# Patient Record
Sex: MALE | Race: WHITE | NOT HISPANIC OR LATINO | Employment: OTHER | URBAN - NONMETROPOLITAN AREA
[De-identification: names, ages, dates, MRNs, and addresses within clinical notes are randomized per-mention and may not be internally consistent; named-entity substitution may affect disease eponyms.]

---

## 2017-06-21 ENCOUNTER — TRANSCRIBE ORDERS (OUTPATIENT)
Dept: ADMINISTRATIVE | Facility: HOSPITAL | Age: 67
End: 2017-06-21

## 2017-06-21 DIAGNOSIS — I65.23 BILATERAL CAROTID ARTERY STENOSIS: Primary | ICD-10-CM

## 2017-06-27 ENCOUNTER — OFFICE VISIT (OUTPATIENT)
Dept: VASCULAR SURGERY | Facility: CLINIC | Age: 67
End: 2017-06-27

## 2017-06-27 ENCOUNTER — HOSPITAL ENCOUNTER (OUTPATIENT)
Dept: ULTRASOUND IMAGING | Facility: HOSPITAL | Age: 67
Discharge: HOME | End: 2017-06-27
Attending: SURGERY

## 2017-06-27 ENCOUNTER — HOSPITAL ENCOUNTER (OUTPATIENT)
Dept: ULTRASOUND IMAGING | Facility: HOSPITAL | Age: 67
Discharge: HOME | End: 2017-06-27
Attending: SURGERY | Admitting: SURGERY

## 2017-06-27 VITALS
SYSTOLIC BLOOD PRESSURE: 124 MMHG | WEIGHT: 237 LBS | BODY MASS INDEX: 30.42 KG/M2 | DIASTOLIC BLOOD PRESSURE: 72 MMHG | HEART RATE: 66 BPM | HEIGHT: 74 IN

## 2017-06-27 DIAGNOSIS — I70.90 ARTERY OCCLUSION: ICD-10-CM

## 2017-06-27 DIAGNOSIS — I71.40 ABDOMINAL AORTIC ANEURYSM (AAA) WITHOUT RUPTURE (HCC): ICD-10-CM

## 2017-06-27 DIAGNOSIS — I71.40 AAA (ABDOMINAL AORTIC ANEURYSM) WITHOUT RUPTURE (HCC): ICD-10-CM

## 2017-06-27 DIAGNOSIS — I65.23 BILATERAL CAROTID ARTERY STENOSIS: ICD-10-CM

## 2017-06-27 DIAGNOSIS — I72.2 RENAL ARTERY ANEURYSM (HCC): ICD-10-CM

## 2017-06-27 DIAGNOSIS — I72.4 BILATERAL POPLITEAL ARTERY ANEURYSM (HCC): Primary | ICD-10-CM

## 2017-06-27 PROCEDURE — 76775 US EXAM ABDO BACK WALL LIM: CPT

## 2017-06-27 PROCEDURE — 93880 EXTRACRANIAL BILAT STUDY: CPT

## 2017-06-27 PROCEDURE — 93880 EXTRACRANIAL BILAT STUDY: CPT | Performed by: SURGERY

## 2017-06-27 PROCEDURE — 93975 VASCULAR STUDY: CPT

## 2017-06-27 PROCEDURE — 99214 OFFICE O/P EST MOD 30 MIN: CPT | Performed by: NURSE PRACTITIONER

## 2017-06-27 RX ORDER — ASPIRIN 81 MG/1
TABLET ORAL
COMMUNITY

## 2017-06-27 RX ORDER — ATORVASTATIN CALCIUM 40 MG/1
40 TABLET, FILM COATED ORAL DAILY
COMMUNITY

## 2017-06-27 RX ORDER — FENOFIBRATE 145 MG/1
145 TABLET, COATED ORAL DAILY
COMMUNITY
End: 2019-06-20

## 2017-06-27 RX ORDER — LISINOPRIL AND HYDROCHLOROTHIAZIDE 20; 12.5 MG/1; MG/1
TABLET ORAL
COMMUNITY
Start: 2013-11-01

## 2017-06-27 NOTE — PROGRESS NOTES
"06/27/2017      Freddy Cedillo MD  [address] [place] [address] [address]  [place] [address]        Dayna Israel  1950    Chief Complaint   Patient presents with   • Follow-up     Abdominal and carotid sttudy results.Denies symptoms       Dear Freddy Cedillo MD:    HPI     I had the pleasure of seeing you patient in the office today for follow up.  As you recall, the patient is a 66 y.o. male who Was a previous patient of Dr. Ricks.  He had a previously repaired 4.9 cm abdominal aortic aneurysm.  We have been following with ultrasound.  He denies any abdominal pain, strokelike symptoms or claudication symptoms to his lower extremities.  He did have noninvasive testing performed today, which I did review in office.    /72  Pulse 66  Ht 73.5\" (186.7 cm)  Wt 237 lb (108 kg)  BMI 30.84 kg/m2  Physical Exam   Constitutional: He is oriented to person, place, and time. He appears well-developed and well-nourished.   HENT:   Head: Normocephalic and atraumatic.   Eyes: Pupils are equal, round, and reactive to light. No scleral icterus.   Neck: Normal range of motion. Neck supple. No thyromegaly present.   Cardiovascular: Normal rate, regular rhythm, normal heart sounds and intact distal pulses.    Pulmonary/Chest: Effort normal and breath sounds normal.   Abdominal: Soft. Bowel sounds are normal.   Musculoskeletal: Normal range of motion.   Neurological: He is alert and oriented to person, place, and time.   Skin: Skin is warm and dry.   Psychiatric: He has a normal mood and affect. His behavior is normal. Judgment and thought content normal.   Nursing note and vitals reviewed.      DIAGNOSTIC DATA:    Us Carotid Bilateral    Result Date: 6/28/2017  Narrative: History: Carotid occlusive disease      Impression: Impression: 1. There is less than 50% stenosis of the right internal carotid artery. 2. There is less than 50% stenosis of the left internal carotid artery. 3. Antegrade flow is demonstrated in " bilateral vertebral arteries.  Comments: Bilateral carotid vertebral arterial duplex scan was performed.  Grayscale imaging shows intimal thickening and calcified elements at the carotid bifurcation. The right internal carotid artery peak systolic velocity is 124 cm/sec. The end-diastolic velocity is 35.2 cm/sec. The right ICA/CCA ratio is approximately 1.25 . These findings correlate with less than 50% stenosis of the right internal carotid artery.  Grayscale imaging shows intimal thickening and calcified elements at the carotid bifurcation. The left internal carotid artery peak systolic velocity is 105 cm/sec. The end-diastolic velocity is 34.6 cm/sec. The left ICA/CCA ratio is approximately 1.09 . These findings correlate with less than 50% stenosis of the left internal carotid artery.  Antegrade flow is demonstrated in bilateral vertebral arteries.    This report was finalized on 06/28/2017 19:50 by Dr. River Selby MD.    Us Aorta Limited    Result Date: 6/27/2017  Narrative: EXAMINATION:  AORTA LIMITED- 6/27/2017 8:53 AM CDT  HISTORY: Abdominal aortic aneurysm, previous stent graft placement.  REPORT: Sonographic images of the abdomen were obtained to evaluate the abdominal aorta. Comparison is made with the most recent ultrasound from 06/23/2016.  The proximal aorta measures 2.2 x 2.5 cm in diameter, the mid aorta measures 2.3 x 2.2 cm in diameter in the distal aorta shows fusiform dilatation, measuring 4.0 x 4.6 cm in transverse diameter. This compares with 4.2 x 4.4 cm previous study. The slight difference is probably related to measuring technique. No real change in the aortic caliber is seen. The right common iliac artery diameter is 1.2 cm, the left common iliac artery diameter is 1.1 cm. This compares with 1.4 and 1.4 cm previously. A stent graft is visible within the distal aorta.      Impression: Stable fusiform distal abdominal aortic aneurysm with maximum diameter of approximately 4.0 x 4.6  cm. Interval decrease in size of maximum diameter of the iliac arteries. This report was finalized on 06/27/2017 09:00 by Dr. Donnie Rene MD.    Us Renal Artery Complete    Result Date: 6/27/2017  Narrative: HISTORY: Renal artery disease.  FINDINGS: Sonography is performed of the kidneys in the transverse and longitudinal projections. The right kidney measures 13.9 cm and the left kidney 13.4 cm in [id number] length. Both kidneys are normal in morphologic appearance with no mass or hydronephrosis. The partially filled urinary bladder is unremarkable.  Doppler examination was performed of the abdominal aorta and renal arteries using B-mode/grayscale imaging with spectral analysis and color flow imaging. Peak systolic velocity within the abdominal aorta is 106 cm/s. Peak systolic velocities on the right are 95, 64, 51 and 18 cm/s respectively within the proximal, mid, distal and arcuate right renal arteries resulting in a maximal velocity ratio at the origin of the right renal artery of 0.90.  Examination of the left renal artery demonstrates peak systolic velocities of 76, 77, 52 and 17 cm/s respectively within the proximal, mid, distal and arcuate left renal arteries with a maximum velocity ratio of 0.72. There are similar resistive indices within the renal arteries bilaterally.      Impression: 1.. Normal sonogram of the kidneys with no mass or hydronephrosis. No evidence of cortical atrophy. 2. Normal peak systolic velocities within the renal arteries with no elevation of the velocity ratio or peak systolic velocity. This report was finalized on 06/27/2017 12:25 by Dr. Jonathan Chacon MD.    Us Renal Bilateral    Result Date: 6/27/2017  Narrative: HISTORY: Renal artery disease.  FINDINGS: Sonography is performed of the kidneys in the transverse and longitudinal projections. The right kidney measures 13.9 cm and the left kidney 13.4 cm in gkxj-aj-wrnl length. Both kidneys are normal in morphologic appearance  with no mass or hydronephrosis. The partially filled urinary bladder is unremarkable.  Doppler examination was performed of the abdominal aorta and renal arteries using B-mode/grayscale imaging with spectral analysis and color flow imaging. Peak systolic velocity within the abdominal aorta is 106 cm/s. Peak systolic velocities on the right are 95, 64, 51 and 18 cm/s respectively within the proximal, mid, distal and arcuate right renal arteries resulting in a maximal velocity ratio at the origin of the right renal artery of 0.90.  Examination of the left renal artery demonstrates peak systolic velocities of 76, 77, 52 and 17 cm/s respectively within the proximal, mid, distal and arcuate left renal arteries with a maximum velocity ratio of 0.72. There are similar resistive indices within the renal arteries bilaterally.      Impression: 1.. Normal sonogram of the kidneys with no mass or hydronephrosis. No evidence of cortical atrophy. 2. Normal peak systolic velocities within the renal arteries with no elevation of the velocity ratio or peak systolic velocity. This report was finalized on 06/27/2017 12:25 by Dr. Jonathan Chacon MD.    HISTORY: Renal artery disease.      FINDINGS: Sonography is performed of the kidneys in the transverse and  longitudinal projections. The right kidney measures 13.9 cm and the left  kidney 13.4 cm in jsjd-nj-post length. Both kidneys are normal in  morphologic appearance with no mass or hydronephrosis. The partially  filled urinary bladder is unremarkable.      Doppler examination was performed of the abdominal aorta and renal  arteries using B-mode/grayscale imaging with spectral analysis and color  flow imaging. Peak systolic velocity within the abdominal aorta is 106  cm/s. Peak systolic velocities on the right are 95, 64, 51 and 18 cm/s  respectively within the proximal, mid, distal and arcuate right renal  arteries resulting in a maximal velocity ratio at the origin of the  right  renal artery of 0.90.      Examination of the left renal artery demonstrates peak systolic  velocities of 76, 77, 52 and 17 cm/s respectively within the proximal,  mid, distal and arcuate left renal arteries with a maximum velocity  ratio of 0.72. There are similar resistive indices within the renal  arteries bilaterally.      IMPRESSION:  1.. Normal sonogram of the kidneys with no mass or hydronephrosis. No  evidence of cortical atrophy.  2. Normal peak systolic velocities within the renal arteries with no  elevation of the velocity ratio or peak systolic velocity.  This report was finalized on 06/27/2017 12:25 by Dr. Jonathan Chacon MD.    History: Carotid occlusive disease      IMPRESSION:  Impression:  1. There is less than 50% stenosis of the right internal carotid artery.  2. There is less than 50% stenosis of the left internal carotid artery.  3. Antegrade flow is demonstrated in bilateral vertebral arteries.      Comments: Bilateral carotid vertebral arterial duplex scan was  performed.      Grayscale imaging shows intimal thickening and calcified elements at the  carotid bifurcation. The right internal carotid artery peak systolic  velocity is 124 cm/sec. The end-diastolic velocity is 35.2 cm/sec. The  right ICA/CCA ratio is approximately 1.25 . These findings correlate  with less than 50% stenosis of the right internal carotid artery.      Grayscale imaging shows intimal thickening and calcified elements at the  carotid bifurcation. The left internal carotid artery peak systolic  velocity is 105 cm/sec. The end-diastolic velocity is 34.6 cm/sec. The  left ICA/CCA ratio is approximately 1.09 . These findings correlate with  less than 50% stenosis of the left internal carotid artery.      Antegrade flow is demonstrated in bilateral vertebral arteries.      This report was finalized on 06/28/2017 19:50 by Dr. River Selby MD.  Patient Active Problem List   Diagnosis   • Small bilateral Popliteal Artery  Aneurysm   • Hypertension   • Hyperlipidemia   • AAA (abdominal aortic aneurysm)   • Carotid stenosis   • Diabetes         ICD-10-CM ICD-9-CM   1. 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 Abdominal aortic aneurysm (AAA) without rupture I71.4 441.4       Lab Frequency Next Occurrence   US carotid bilateral Once 9/2/2016   US Aorta Limited Once 6/27/2018   US Arterial Doppler Lower Extremity Bilateral Once 6/27/2018       PLAN: After thoroughly evaluating Dayna Israel, I believe the best course of action is to remain conservative from a vascular standpoint.  We will see Dayna Israel back in 1 year with repeat noninvasive testing for continued surveillance.  Next visit we will check his carotids and bilateral popliteal artery aneurysms.  We will recheck his aorta in 2 years.  The patient is to continue taking their medications as previously discussed.   This was all discussed in full with complete understanding.  Thank you for allowing me to participate in the care of your patient.  Please do not hesitate to call with any questions or concerns.  We will keep you aware of any further encounters with Dayna Israel.      Sincerely Yours,      PRANAY Walsh

## 2017-07-03 LAB
ALBUMIN SERPL-MCNC: 3.9 G/DL (ref 3.5–5.2)
ALP BLD-CCNC: 57 U/L (ref 40–130)
ALT SERPL-CCNC: 28 U/L (ref 5–41)
ANION GAP SERPL CALCULATED.3IONS-SCNC: 21 MMOL/L (ref 7–19)
AST SERPL-CCNC: 22 U/L (ref 5–40)
BILIRUB SERPL-MCNC: 0.3 MG/DL (ref 0.2–1.2)
BUN BLDV-MCNC: 16 MG/DL (ref 8–23)
CALCIUM SERPL-MCNC: 9.3 MG/DL (ref 8.8–10.2)
CHLORIDE BLD-SCNC: 102 MMOL/L (ref 98–111)
CO2: 21 MMOL/L (ref 22–29)
CREAT SERPL-MCNC: 0.9 MG/DL (ref 0.5–1.2)
CREATININE URINE: 194.6 MG/DL (ref 4.2–622)
GFR NON-AFRICAN AMERICAN: >60
GLUCOSE BLD-MCNC: 194 MG/DL (ref 74–109)
HBA1C MFR BLD: 8.3 %
LDL CHOLESTEROL DIRECT: 93 MG/DL
MICROALBUMIN UR-MCNC: <1.2 MG/DL (ref 0–19)
MICROALBUMIN/CREAT UR-RTO: NORMAL MG/G
POTASSIUM SERPL-SCNC: 3.8 MMOL/L (ref 3.5–5)
SODIUM BLD-SCNC: 144 MMOL/L (ref 136–145)
TOTAL PROTEIN: 7.3 G/DL (ref 6.6–8.7)

## 2017-07-11 ENCOUNTER — OFFICE VISIT (OUTPATIENT)
Dept: INTERNAL MEDICINE | Age: 67
End: 2017-07-11
Payer: MEDICARE

## 2017-07-11 VITALS
OXYGEN SATURATION: 96 % | RESPIRATION RATE: 18 BRPM | HEART RATE: 58 BPM | SYSTOLIC BLOOD PRESSURE: 132 MMHG | HEIGHT: 72 IN | BODY MASS INDEX: 30.34 KG/M2 | DIASTOLIC BLOOD PRESSURE: 68 MMHG | WEIGHT: 224 LBS | TEMPERATURE: 97.8 F

## 2017-07-11 DIAGNOSIS — E78.2 MIXED HYPERLIPIDEMIA: ICD-10-CM

## 2017-07-11 DIAGNOSIS — E11.9 TYPE 2 DIABETES MELLITUS WITHOUT COMPLICATION, WITHOUT LONG-TERM CURRENT USE OF INSULIN (HCC): ICD-10-CM

## 2017-07-11 DIAGNOSIS — Z00.00 HEALTH CARE MAINTENANCE: Primary | ICD-10-CM

## 2017-07-11 PROCEDURE — 1036F TOBACCO NON-USER: CPT | Performed by: NURSE PRACTITIONER

## 2017-07-11 PROCEDURE — 3046F HEMOGLOBIN A1C LEVEL >9.0%: CPT | Performed by: NURSE PRACTITIONER

## 2017-07-11 PROCEDURE — 99214 OFFICE O/P EST MOD 30 MIN: CPT | Performed by: NURSE PRACTITIONER

## 2017-07-11 PROCEDURE — 1123F ACP DISCUSS/DSCN MKR DOCD: CPT | Performed by: NURSE PRACTITIONER

## 2017-07-11 PROCEDURE — 3017F COLORECTAL CA SCREEN DOC REV: CPT | Performed by: NURSE PRACTITIONER

## 2017-07-11 PROCEDURE — G8417 CALC BMI ABV UP PARAM F/U: HCPCS | Performed by: NURSE PRACTITIONER

## 2017-07-11 PROCEDURE — 4040F PNEUMOC VAC/ADMIN/RCVD: CPT | Performed by: NURSE PRACTITIONER

## 2017-07-11 PROCEDURE — G8427 DOCREV CUR MEDS BY ELIG CLIN: HCPCS | Performed by: NURSE PRACTITIONER

## 2017-07-11 RX ORDER — LISINOPRIL AND HYDROCHLOROTHIAZIDE 20; 12.5 MG/1; MG/1
TABLET ORAL
COMMUNITY
Start: 2013-11-01 | End: 2017-07-19 | Stop reason: SDUPTHER

## 2017-07-11 RX ORDER — FENOFIBRATE 145 MG/1
145 TABLET, COATED ORAL
COMMUNITY
End: 2017-07-19 | Stop reason: SDUPTHER

## 2017-07-11 RX ORDER — ATORVASTATIN CALCIUM 10 MG/1
TABLET, FILM COATED ORAL
COMMUNITY
End: 2017-07-19 | Stop reason: SDUPTHER

## 2017-07-11 ASSESSMENT — PATIENT HEALTH QUESTIONNAIRE - PHQ9
1. LITTLE INTEREST OR PLEASURE IN DOING THINGS: 0
2. FEELING DOWN, DEPRESSED OR HOPELESS: 0
SUM OF ALL RESPONSES TO PHQ QUESTIONS 1-9: 0
SUM OF ALL RESPONSES TO PHQ9 QUESTIONS 1 & 2: 0

## 2017-07-11 ASSESSMENT — ENCOUNTER SYMPTOMS
EYE DISCHARGE: 0
STRIDOR: 0
CONSTIPATION: 0
WHEEZING: 0
ABDOMINAL DISTENTION: 0
ABDOMINAL PAIN: 0
CHOKING: 0
VOMITING: 0
SHORTNESS OF BREATH: 0
EYE ITCHING: 0
NAUSEA: 0
DIARRHEA: 0
COLOR CHANGE: 0
SORE THROAT: 0
TROUBLE SWALLOWING: 0
BLOOD IN STOOL: 0
COUGH: 0

## 2017-07-19 RX ORDER — ATORVASTATIN CALCIUM 10 MG/1
10 TABLET, FILM COATED ORAL DAILY
Qty: 90 TABLET | Refills: 3 | Status: SHIPPED | OUTPATIENT
Start: 2017-07-19 | End: 2018-06-14 | Stop reason: SDUPTHER

## 2017-07-19 RX ORDER — METOPROLOL SUCCINATE 50 MG/1
50 TABLET, EXTENDED RELEASE ORAL DAILY
Qty: 90 TABLET | Refills: 3 | Status: SHIPPED | OUTPATIENT
Start: 2017-07-19 | End: 2018-06-14 | Stop reason: SDUPTHER

## 2017-07-19 RX ORDER — LISINOPRIL AND HYDROCHLOROTHIAZIDE 20; 12.5 MG/1; MG/1
1 TABLET ORAL DAILY
Qty: 90 TABLET | Refills: 3 | Status: SHIPPED | OUTPATIENT
Start: 2017-07-19 | End: 2018-06-14 | Stop reason: SDUPTHER

## 2017-07-19 RX ORDER — FENOFIBRATE 145 MG/1
145 TABLET, COATED ORAL DAILY
Qty: 90 TABLET | Refills: 3 | Status: SHIPPED | OUTPATIENT
Start: 2017-07-19 | End: 2018-06-14 | Stop reason: SDUPTHER

## 2018-01-29 DIAGNOSIS — Z00.00 HEALTH CARE MAINTENANCE: ICD-10-CM

## 2018-01-29 DIAGNOSIS — E78.2 MIXED HYPERLIPIDEMIA: ICD-10-CM

## 2018-01-29 DIAGNOSIS — E11.9 TYPE 2 DIABETES MELLITUS WITHOUT COMPLICATION, WITHOUT LONG-TERM CURRENT USE OF INSULIN (HCC): ICD-10-CM

## 2018-01-29 LAB
ALBUMIN SERPL-MCNC: 4 G/DL (ref 3.5–5.2)
ALP BLD-CCNC: 47 U/L (ref 40–130)
ALT SERPL-CCNC: 23 U/L (ref 5–41)
ANION GAP SERPL CALCULATED.3IONS-SCNC: 13 MMOL/L (ref 7–19)
AST SERPL-CCNC: 21 U/L (ref 5–40)
BASOPHILS ABSOLUTE: 0 K/UL (ref 0–0.2)
BASOPHILS RELATIVE PERCENT: 0.5 % (ref 0–1)
BILIRUB SERPL-MCNC: 0.3 MG/DL (ref 0.2–1.2)
BUN BLDV-MCNC: 16 MG/DL (ref 8–23)
CALCIUM SERPL-MCNC: 9.4 MG/DL (ref 8.8–10.2)
CHLORIDE BLD-SCNC: 103 MMOL/L (ref 98–111)
CHOLESTEROL, TOTAL: 123 MG/DL (ref 160–199)
CO2: 26 MMOL/L (ref 22–29)
CREAT SERPL-MCNC: 0.9 MG/DL (ref 0.5–1.2)
EOSINOPHILS ABSOLUTE: 0.1 K/UL (ref 0–0.6)
EOSINOPHILS RELATIVE PERCENT: 1.6 % (ref 0–5)
GFR NON-AFRICAN AMERICAN: >60
GLUCOSE BLD-MCNC: 176 MG/DL (ref 74–109)
HBA1C MFR BLD: 7.4 %
HCT VFR BLD CALC: 43.9 % (ref 42–52)
HDLC SERPL-MCNC: 25 MG/DL (ref 55–121)
HEMOGLOBIN: 13.6 G/DL (ref 14–18)
LDL CHOLESTEROL CALCULATED: 76 MG/DL
LYMPHOCYTES ABSOLUTE: 2.4 K/UL (ref 1.1–4.5)
LYMPHOCYTES RELATIVE PERCENT: 39.1 % (ref 20–40)
MCH RBC QN AUTO: 27.5 PG (ref 27–31)
MCHC RBC AUTO-ENTMCNC: 31 G/DL (ref 33–37)
MCV RBC AUTO: 88.9 FL (ref 80–94)
MONOCYTES ABSOLUTE: 0.5 K/UL (ref 0–0.9)
MONOCYTES RELATIVE PERCENT: 7.7 % (ref 0–10)
NEUTROPHILS ABSOLUTE: 3.1 K/UL (ref 1.5–7.5)
NEUTROPHILS RELATIVE PERCENT: 50.8 % (ref 50–65)
PDW BLD-RTO: 12.4 % (ref 11.5–14.5)
PLATELET # BLD: 295 K/UL (ref 130–400)
PLATELET SLIDE REVIEW: ADEQUATE
PMV BLD AUTO: 11.2 FL (ref 9.4–12.4)
POTASSIUM SERPL-SCNC: 4 MMOL/L (ref 3.5–5)
RBC # BLD: 4.94 M/UL (ref 4.7–6.1)
SODIUM BLD-SCNC: 142 MMOL/L (ref 136–145)
TOTAL PROTEIN: 7 G/DL (ref 6.6–8.7)
TRIGL SERPL-MCNC: 108 MG/DL (ref 0–149)
WBC # BLD: 6.1 K/UL (ref 4.8–10.8)

## 2018-02-07 ENCOUNTER — OFFICE VISIT (OUTPATIENT)
Dept: INTERNAL MEDICINE | Age: 68
End: 2018-02-07
Payer: MEDICARE

## 2018-02-07 VITALS
HEART RATE: 69 BPM | OXYGEN SATURATION: 100 % | HEIGHT: 74 IN | BODY MASS INDEX: 27.59 KG/M2 | DIASTOLIC BLOOD PRESSURE: 82 MMHG | SYSTOLIC BLOOD PRESSURE: 142 MMHG | WEIGHT: 215 LBS

## 2018-02-07 DIAGNOSIS — I72.4 POPLITEAL ARTERY ANEURYSM (HCC): ICD-10-CM

## 2018-02-07 DIAGNOSIS — Z86.010 HISTORY OF ADENOMATOUS POLYP OF COLON: ICD-10-CM

## 2018-02-07 DIAGNOSIS — I71.40 ABDOMINAL AORTIC ANEURYSM (AAA) WITHOUT RUPTURE: ICD-10-CM

## 2018-02-07 DIAGNOSIS — I10 ESSENTIAL HYPERTENSION: ICD-10-CM

## 2018-02-07 DIAGNOSIS — E11.9 TYPE 2 DIABETES MELLITUS WITHOUT COMPLICATION, WITHOUT LONG-TERM CURRENT USE OF INSULIN (HCC): Primary | ICD-10-CM

## 2018-02-07 PROBLEM — I65.29 CAROTID STENOSIS: Status: ACTIVE | Noted: 2018-02-07

## 2018-02-07 PROCEDURE — G8598 ASA/ANTIPLAT THER USED: HCPCS | Performed by: INTERNAL MEDICINE

## 2018-02-07 PROCEDURE — 3045F PR MOST RECENT HEMOGLOBIN A1C LEVEL 7.0-9.0%: CPT | Performed by: INTERNAL MEDICINE

## 2018-02-07 PROCEDURE — G8419 CALC BMI OUT NRM PARAM NOF/U: HCPCS | Performed by: INTERNAL MEDICINE

## 2018-02-07 PROCEDURE — 99214 OFFICE O/P EST MOD 30 MIN: CPT | Performed by: INTERNAL MEDICINE

## 2018-02-07 PROCEDURE — 1123F ACP DISCUSS/DSCN MKR DOCD: CPT | Performed by: INTERNAL MEDICINE

## 2018-02-07 PROCEDURE — G8427 DOCREV CUR MEDS BY ELIG CLIN: HCPCS | Performed by: INTERNAL MEDICINE

## 2018-02-07 PROCEDURE — 1036F TOBACCO NON-USER: CPT | Performed by: INTERNAL MEDICINE

## 2018-02-07 PROCEDURE — 4040F PNEUMOC VAC/ADMIN/RCVD: CPT | Performed by: INTERNAL MEDICINE

## 2018-02-07 PROCEDURE — 93000 ELECTROCARDIOGRAM COMPLETE: CPT | Performed by: INTERNAL MEDICINE

## 2018-02-07 PROCEDURE — 3017F COLORECTAL CA SCREEN DOC REV: CPT | Performed by: INTERNAL MEDICINE

## 2018-02-07 PROCEDURE — G8484 FLU IMMUNIZE NO ADMIN: HCPCS | Performed by: INTERNAL MEDICINE

## 2018-02-07 ASSESSMENT — ENCOUNTER SYMPTOMS
TROUBLE SWALLOWING: 0
SHORTNESS OF BREATH: 0
ABDOMINAL PAIN: 0
RHINORRHEA: 0
SORE THROAT: 0
NAUSEA: 0
COUGH: 0

## 2018-02-07 NOTE — PATIENT INSTRUCTIONS
when cooking. · Don't skip meals. Your blood sugar may drop too low if you skip meals and take insulin or certain medicines for diabetes. · Check with your doctor before you drink alcohol. Alcohol can cause your blood sugar to drop too low. Alcohol can also cause a bad reaction if you take certain diabetes medicines. Follow-up care is a key part of your treatment and safety. Be sure to make and go to all appointments, and call your doctor if you are having problems. It's also a good idea to know your test results and keep a list of the medicines you take. Where can you learn more? Go to [email or web address] org and sign in to your [employer] account. Enter Z409 in the RawFlow box to learn more about \"Learning About Diabetes Food Guidelines. \"     If you do not have an account, please click on the \"Sign Up Now\" link. Current as of: March 13, 2017  Content Version: 11.5  © 8734-4372 Healthwise, [employer]. Care instructions adapted under license by [facility] ([facility]). If you have questions about a medical condition or this instruction, always ask your healthcare professional. [place] [address] any warranty or liability for your use of this information.      Mr. Dominick Adame is encouraged to take his blood pressure, midday, 2 or 3 times a month, report values above 171 systolic  He will call us in a week, at we locate his immunization record, so he can get his 2nd vaccine, likely a 2nd pneumonia vaccine

## 2018-02-07 NOTE — PROGRESS NOTES
- 18.0 g/dL Final    Hematocrit 01/29/2018 43.9  42.0 - 52.0 % Final    MCV 01/29/2018 88.9  80.0 - 94.0 fL Final    MCH 01/29/2018 27.5  27.0 - 31.0 pg Final    MCHC 01/29/2018 31.0* 33.0 - 37.0 g/dL Final    RDW 01/29/2018 12.4  11.5 - 14.5 % Final    Platelets 45/15/9644 295  130 - 400 K/uL Final    MPV 01/29/2018 11.2  9.4 - 12.4 fL Final    PLATELET SLIDE REVIEW 01/29/2018 Adequate   Final    Neutrophils % 01/29/2018 50.8  50.0 - 65.0 % Final    Lymphocytes % 01/29/2018 39.1  20.0 - 40.0 % Final    Monocytes % 01/29/2018 7.7  0.0 - 10.0 % Final    Eosinophils % 01/29/2018 1.6  0.0 - 5.0 % Final    Basophils % 01/29/2018 0.5  0.0 - 1.0 % Final    Neutrophils # 01/29/2018 3.1  1.5 - 7.5 K/uL Final    Lymphocytes # 01/29/2018 2.4  1.1 - 4.5 K/uL Final    Monocytes # 01/29/2018 0.50  0.00 - 0.90 K/uL Final    Eosinophils # 01/29/2018 0.10  0.00 - 0.60 K/uL Final    Basophils # 01/29/2018 0.00  0.00 - 0.20 K/uL Final    Sodium 01/29/2018 142  136 - 145 mmol/L Final    Potassium 01/29/2018 4.0  3.5 - 5.0 mmol/L Final    Chloride 01/29/2018 103  98 - 111 mmol/L Final    CO2 01/29/2018 26  22 - 29 mmol/L Final    Anion Gap 01/29/2018 13  7 - 19 mmol/L Final    Glucose 01/29/2018 176* 74 - 109 mg/dL Final    BUN 01/29/2018 16  8 - 23 mg/dL Final    CREATININE 01/29/2018 0.9  0.5 - 1.2 mg/dL Final    GFR Non- 01/29/2018 >60  >60 Final    Calcium 01/29/2018 9.4  8.8 - 10.2 mg/dL Final    Total Protein 01/29/2018 7.0  6.6 - 8.7 g/dL Final    Alb 01/29/2018 4.0  3.5 - 5.2 g/dL Final    Total Bilirubin 01/29/2018 0.3  0.2 - 1.2 mg/dL Final    Alkaline Phosphatase 01/29/2018 47  40 - 130 U/L Final    ALT 01/29/2018 23  5 - 41 U/L Final    AST 01/29/2018 21  5 - 40 U/L Final    Hemoglobin A1C 01/29/2018 7.4* See comment % Final    Cholesterol, Total 01/29/2018 123* 160 - 199 mg/dL Final    Triglycerides 01/29/2018 108  0 - 149 mg/dL Final    HDL 01/29/2018 25* 55 - 121

## 2018-06-13 ENCOUNTER — TELEPHONE (OUTPATIENT)
Dept: VASCULAR SURGERY | Facility: CLINIC | Age: 68
End: 2018-06-13

## 2018-06-13 NOTE — TELEPHONE ENCOUNTER
Spoke with patient's wife regarding appointment.  Dr. Selby will be in surgery so Katharina will be seeing his patients on 6/26/2018.

## 2018-06-14 RX ORDER — METOPROLOL SUCCINATE 50 MG/1
TABLET, EXTENDED RELEASE ORAL
Qty: 90 TABLET | Refills: 3 | Status: ON HOLD | OUTPATIENT
Start: 2018-06-14 | End: 2021-07-19 | Stop reason: HOSPADM

## 2018-06-14 RX ORDER — ATORVASTATIN CALCIUM 10 MG/1
TABLET, FILM COATED ORAL
Qty: 90 TABLET | Refills: 3 | Status: SHIPPED | OUTPATIENT
Start: 2018-06-14

## 2018-06-14 RX ORDER — FENOFIBRATE 145 MG/1
TABLET, COATED ORAL
Qty: 90 TABLET | Refills: 3 | Status: SHIPPED | OUTPATIENT
Start: 2018-06-14 | End: 2019-12-30 | Stop reason: ALTCHOICE

## 2018-06-14 RX ORDER — LISINOPRIL AND HYDROCHLOROTHIAZIDE 20; 12.5 MG/1; MG/1
TABLET ORAL
Qty: 90 TABLET | Refills: 3 | Status: SHIPPED | OUTPATIENT
Start: 2018-06-14

## 2018-06-26 ENCOUNTER — OFFICE VISIT (OUTPATIENT)
Dept: VASCULAR SURGERY | Facility: CLINIC | Age: 68
End: 2018-06-26

## 2018-06-26 ENCOUNTER — HOSPITAL ENCOUNTER (OUTPATIENT)
Dept: ULTRASOUND IMAGING | Facility: HOSPITAL | Age: 68
Discharge: HOME | End: 2018-06-26

## 2018-06-26 ENCOUNTER — HOSPITAL ENCOUNTER (OUTPATIENT)
Dept: ULTRASOUND IMAGING | Facility: HOSPITAL | Age: 68
Discharge: HOME | End: 2018-06-26
Admitting: NURSE PRACTITIONER

## 2018-06-26 VITALS
SYSTOLIC BLOOD PRESSURE: 156 MMHG | WEIGHT: 221 LBS | BODY MASS INDEX: 28.36 KG/M2 | HEART RATE: 56 BPM | HEIGHT: 74 IN | DIASTOLIC BLOOD PRESSURE: 86 MMHG

## 2018-06-26 DIAGNOSIS — I71.40 ABDOMINAL AORTIC ANEURYSM (AAA) WITHOUT RUPTURE (HCC): ICD-10-CM

## 2018-06-26 DIAGNOSIS — I65.23 BILATERAL CAROTID ARTERY STENOSIS: ICD-10-CM

## 2018-06-26 DIAGNOSIS — I72.4 POPLITEAL ARTERY ANEURYSM (HCC): Primary | ICD-10-CM

## 2018-06-26 DIAGNOSIS — I72.4 BILATERAL POPLITEAL ARTERY ANEURYSM (HCC): ICD-10-CM

## 2018-06-26 DIAGNOSIS — E78.5 HYPERLIPIDEMIA, UNSPECIFIED HYPERLIPIDEMIA TYPE: ICD-10-CM

## 2018-06-26 DIAGNOSIS — I10 ESSENTIAL HYPERTENSION: ICD-10-CM

## 2018-06-26 PROCEDURE — 99213 OFFICE O/P EST LOW 20 MIN: CPT | Performed by: SURGERY

## 2018-06-26 PROCEDURE — 93923 UPR/LXTR ART STDY 3+ LVLS: CPT

## 2018-06-26 PROCEDURE — 76775 US EXAM ABDO BACK WALL LIM: CPT

## 2018-06-26 PROCEDURE — 93925 LOWER EXTREMITY STUDY: CPT | Performed by: SURGERY

## 2018-06-26 NOTE — PROGRESS NOTES
"06/26/2018       PRANAY Osorio  [address] [place] [address] [address]  [place] [address]        Kelvinmarco antonio Israel  1950    Chief Complaint   Patient presents with   • Follow-up     Follow up for aorta and BLE studies.Denies symptoms.       Dear PRANAY Osorio:    HPI     I had the pleasure of seeing you patient in the office today for follow up.  As you recall, the patient is a 67 y.o. male who was a previous patient of Dr. Ricks.  He had a previously repaired 4.9 cm abdominal aortic aneurysm.  We have been following with ultrasound.  He denies any abdominal pain, strokelike symptoms or claudication symptoms to his lower extremities.  He did have noninvasive testing performed today including duplex of popliteal artery , which I did review in office.    /86   Pulse 56   Ht 186.7 cm (73.5\")   Wt 100 kg (221 lb)   BMI 28.76 kg/m²   Physical Exam   Constitutional: He is oriented to person, place, and time. He appears well-developed and well-nourished.   HENT:   Head: Normocephalic and atraumatic.   Eyes: Pupils are equal, round, and reactive to light. No scleral icterus.   Neck: Normal range of motion. Neck supple. No thyromegaly present.   Cardiovascular: Normal rate, regular rhythm, normal heart sounds and intact distal pulses.    Pulmonary/Chest: Effort normal and breath sounds normal.   Abdominal: Soft. Bowel sounds are normal.   Musculoskeletal: Normal range of motion.   Neurological: He is alert and oriented to person, place, and time.   Skin: Skin is warm and dry.   Psychiatric: He has a normal mood and affect. His behavior is normal. Judgment and thought content normal.   Nursing note and vitals reviewed.      DIAGNOSTIC DATA:  Popliteal US;    Maximum Diameter Rt pop-.9x1.1cm  PSV- 57.5 cm/sec    Lt pop 1.0x1.1cm  68 cm/sec    Us Aorta Limited    Result Date: 6/26/2018  Narrative: EXAMINATION:  US AORTA LIMITED-  6/26/2018 9:27 AM CDT  HISTORY: I71.4-Abdominal aortic aneurysm, " without rupture.  COMPARISON: No comparison study.  TECHNIQUE: Grayscale, color-flow and spectral Doppler imaging were performed.  FINDINGS: The proximal abdominal aorta measures 2.7 x 2.4 cm. The mid abdominal aorta measures 2.5 x 2.2 cm. The distal abdominal aorta measures 4.5 x 4.6 cm (previously measured 4 x 4.6 cm). There is a patent aortic endograft. The common iliac arteries measure 1.4 cm on the right and 1.5 cm on the left.      Impression: 1. Prior aortoiliac endograft repair of abdominal aortic aneurysm. 2. The distal native abdominal aortic diameter may be minimally larger on the current study versus differences in measurement technique. Correlate clinically. CT angiography might be indicated given a possible increase in size. This report was finalized on 06/26/2018 10:09 by Dr. Joe Terrell MD.       Patient Active Problem List   Diagnosis   • Small bilateral Popliteal Artery Aneurysm   • Hypertension   • Hyperlipidemia   • AAA (abdominal aortic aneurysm)   • Carotid stenosis   • Diabetes         ICD-10-CM ICD-9-CM   1. Small bilateral Popliteal Artery Aneurysm I72.4 442.3   2. Abdominal aortic aneurysm (AAA) without rupture I71.4 441.4   3. Essential hypertension I10 401.9   4. Hyperlipidemia, unspecified hyperlipidemia type E78.5 272.4   5. Bilateral carotid artery stenosis I65.23 433.10     433.30       Lab Frequency Next Occurrence   US carotid bilateral Once 9/2/2016   US Aorta Limited Once 6/27/2018   US Arterial Doppler Lower Extremity Bilateral Once 6/27/2018       PLAN: After thoroughly evaluating Dayna Israel, I believe the best course of action is to remain conservative from a vascular standpoint.  We will see Dayna Israel back in 1 year with repeat noninvasive testing for continued surveillance, including a popliteal duplex, carotid duplex, and ultrasound aorta.  I did discuss vascular risk factors as they pertain to the progression of vascular disease including controlling his  hypertension, hyperlipidemia, and diabetes mellitus.  Body mass index is 28.76 kg/m².   BMI chart given.  The patient is to continue taking their medications as previously discussed.   This was all discussed in full with complete understanding.  Thank you for allowing me to participate in the care of your patient.  Please do not hesitate to call with any questions or concerns.  We will keep you aware of any further encounters with Dayna Israel.      Sincerely Yours,      PRANAY Walsh

## 2018-10-26 ENCOUNTER — TRANSCRIBE ORDERS (OUTPATIENT)
Dept: ADMINISTRATIVE | Facility: HOSPITAL | Age: 68
End: 2018-10-26

## 2018-10-26 DIAGNOSIS — E11.69 TYPE 2 DIABETES MELLITUS WITH OTHER SPECIFIED COMPLICATION, UNSPECIFIED WHETHER LONG TERM INSULIN USE (HCC): Primary | ICD-10-CM

## 2019-06-18 ENCOUNTER — TELEPHONE (OUTPATIENT)
Dept: VASCULAR SURGERY | Facility: CLINIC | Age: 69
End: 2019-06-18

## 2019-06-20 ENCOUNTER — HOSPITAL ENCOUNTER (OUTPATIENT)
Dept: ULTRASOUND IMAGING | Facility: HOSPITAL | Age: 69
Discharge: HOME | End: 2019-06-20

## 2019-06-20 ENCOUNTER — OFFICE VISIT (OUTPATIENT)
Dept: VASCULAR SURGERY | Facility: CLINIC | Age: 69
End: 2019-06-20

## 2019-06-20 ENCOUNTER — HOSPITAL ENCOUNTER (OUTPATIENT)
Dept: ULTRASOUND IMAGING | Facility: HOSPITAL | Age: 69
Discharge: HOME | End: 2019-06-20
Admitting: NURSE PRACTITIONER

## 2019-06-20 VITALS
OXYGEN SATURATION: 98 % | HEART RATE: 65 BPM | WEIGHT: 217.4 LBS | HEIGHT: 74 IN | BODY MASS INDEX: 27.9 KG/M2 | DIASTOLIC BLOOD PRESSURE: 62 MMHG | SYSTOLIC BLOOD PRESSURE: 128 MMHG

## 2019-06-20 DIAGNOSIS — I72.4 BILATERAL POPLITEAL ARTERY ANEURYSM (HCC): ICD-10-CM

## 2019-06-20 DIAGNOSIS — I71.40 ABDOMINAL AORTIC ANEURYSM (AAA) WITHOUT RUPTURE (HCC): Primary | ICD-10-CM

## 2019-06-20 DIAGNOSIS — I65.23 BILATERAL CAROTID ARTERY STENOSIS: ICD-10-CM

## 2019-06-20 DIAGNOSIS — I72.4 POPLITEAL ARTERY ANEURYSM (HCC): ICD-10-CM

## 2019-06-20 DIAGNOSIS — I71.40 ABDOMINAL AORTIC ANEURYSM (AAA) WITHOUT RUPTURE (HCC): ICD-10-CM

## 2019-06-20 DIAGNOSIS — I10 ESSENTIAL HYPERTENSION: ICD-10-CM

## 2019-06-20 DIAGNOSIS — E78.5 HYPERLIPIDEMIA, UNSPECIFIED HYPERLIPIDEMIA TYPE: ICD-10-CM

## 2019-06-20 PROCEDURE — 93923 UPR/LXTR ART STDY 3+ LVLS: CPT

## 2019-06-20 PROCEDURE — 93880 EXTRACRANIAL BILAT STUDY: CPT | Performed by: SURGERY

## 2019-06-20 PROCEDURE — 93925 LOWER EXTREMITY STUDY: CPT | Performed by: SURGERY

## 2019-06-20 PROCEDURE — 99214 OFFICE O/P EST MOD 30 MIN: CPT | Performed by: NURSE PRACTITIONER

## 2019-06-20 PROCEDURE — 93880 EXTRACRANIAL BILAT STUDY: CPT

## 2019-06-20 PROCEDURE — 76775 US EXAM ABDO BACK WALL LIM: CPT

## 2019-06-20 NOTE — PROGRESS NOTES
"6/20/2019       Jeremiah Amanda MD  [address]  [place] [address]        Dayna Israel  1950    Chief Complaint   Patient presents with   • Follow-up     1 Year Follow UP For Abdominal Aortic Aneurysm without Rupture, Bilateral Carotid Artery Stenosis, and Small Bilateral Popliteal Artery Aneurysm. Test 062019 US pad lower ext arteries bilat, US pad carotid bilateral, and US pad aorta doan. Patient denies any stroke like symptoms.        Dear Jeremiah Amanda MD:    HPI     I had the pleasure of seeing you patient in the office today for follow up.  As you recall, the patient is a 68 y.o. male who was a previous patient of Dr. Ricks.  He had a previously repaired 4.9 cm abdominal aortic aneurysm.  We have been following with ultrasound.  He denies any abdominal pain, strokelike symptoms or claudication symptoms to his lower extremities.  He is maintained on aspirin and Lipitor.  He did have noninvasive testing performed today including duplex of popliteal artery , which I did review in office.    Review of Systems   Constitutional: Negative.    HENT: Negative.    Eyes: Negative.    Respiratory: Negative.    Cardiovascular: Negative.    Gastrointestinal: Negative.    Endocrine: Negative.    Genitourinary: Negative.    Musculoskeletal: Negative.    Skin: Negative.    Allergic/Immunologic: Negative.    Neurological: Negative.    Hematological: Negative.    Psychiatric/Behavioral: Negative.    All other systems reviewed and are negative.       /62 (BP Location: Left arm, Patient Position: Sitting, Cuff Size: Adult)   Pulse 65   Ht 188 cm (74\")   Wt 98.6 kg (217 lb 6.4 oz)   SpO2 98%   BMI 27.91 kg/m²   Physical Exam   Constitutional: He is oriented to person, place, and time. He appears well-developed and well-nourished.   HENT:   Head: Normocephalic and atraumatic.   Eyes: Pupils are equal, round, and reactive to light. No scleral icterus.   Neck: Normal range of motion. " Neck supple. No thyromegaly present.   Cardiovascular: Normal rate, regular rhythm, normal heart sounds and intact distal pulses.   Prominent popliteals   Pulmonary/Chest: Effort normal and breath sounds normal.   Abdominal: Soft. Bowel sounds are normal.   Musculoskeletal: Normal range of motion.   Neurological: He is alert and oriented to person, place, and time.   Skin: Skin is warm and dry.   Psychiatric: He has a normal mood and affect. His behavior is normal. Judgment and thought content normal.   Nursing note and vitals reviewed.      DIAGNOSTIC DATA:  Carotid US: Less than 50% stenosis bilaterally with bilateral antegrade vertebral flow.    Popliteal US;  [place], Lanny YAIR on 6/20/2019  9:44 AM   RT Pop A   prox    0.94 x 0.91 cm    57.5 cm/s   mid     0.86 x 0.84 cm       dist     0.73 x 0.89 cm    56.9 cm/s    LT Pop A   prox    0.78 x 0.93 cm    67.4 cm/s   mid     0.80 x 0.90 cm   dist     0.70 x 0.75 cm    65.7 cm/s          Us Aorta Limited    Result Date: 6/20/2019  Narrative: EXAMINATION: US AORTA LIMITED- 6/20/2019 9:10 AM CDT  HISTORY: Abdominal aortic aneurysm  COMPARISON: Limited aortic ultrasound 06/26/2018  FINDINGS: Transabdominal sonographic evaluation of the aorta was performed in the transverse and longitudinal projections.  Proximal abdominal aorta measures 2.3 x 2.3 cm. The mid abdominal aorta measures 2.1 x 2.1 cm. Endovascular stent graft is seen in the distal abdominal aorta. The distal abdominal aorta measures 4.2 x 4.2 cm, previously measuring 4.5 x 4.6 cm. The aortic bifurcation appears normal.      Impression: Previous endovascular stent graft repair of the distal abdominal aortic aneurysm, with the native aorta measuring up to 4.2 cm in size, previously measuring up to 4.6 cm. This report was finalized on 06/20/2019 09:13 by Dr. Johnny Fulton MD.       Patient Active Problem List   Diagnosis   • Small bilateral Popliteal Artery Aneurysm   • Hypertension   • Hyperlipidemia   •  AAA (abdominal aortic aneurysm) (CMS/HCC)   • Carotid stenosis   • Diabetes (CMS/HCC)         ICD-10-CM ICD-9-CM   1. Abdominal aortic aneurysm (AAA) without rupture (CMS/HCC) I71.4 441.4   2. Bilateral carotid artery stenosis I65.23 433.10     433.30   3. Bilateral popliteal artery aneurysm (CMS/HCC) I72.4 442.3   4. Essential hypertension I10 401.9   5. Hyperlipidemia, unspecified hyperlipidemia type E78.5 272.4         PLAN: After thoroughly evaluating Dayna Israel, I believe the best course of action is to remain conservative from a vascular standpoint.  I did review his testing and aneurysm is slightly smaller measuring 4.2 cm, his popliteal arteries are unchanged, and carotid duplex is less than 50% stenosis bilaterally.  We will see Dayna Israel back in 1 year with repeat noninvasive testing for continued surveillance, including a popliteal duplex, carotid duplex, and ultrasound aorta.  I did discuss vascular risk factors as they pertain to the progression of vascular disease including controlling his hypertension, hyperlipidemia, and diabetes mellitus.  His blood pressure appears well controlled on his current medication regimen.  He is maintained on Lipitor for his hyperlipidemia. Body mass index is 27.91 kg/m².   BMI chart given.  The patient is to continue taking their medications as previously discussed.   This was all discussed in full with complete understanding.  Thank you for allowing me to participate in the care of your patient.  Please do not hesitate to call with any questions or concerns.  We will keep you aware of any further encounters with Dayna Israel.      Sincerely Yours,      PRANAY Walsh

## 2019-12-30 ENCOUNTER — OFFICE VISIT (OUTPATIENT)
Dept: GASTROENTEROLOGY | Age: 69
End: 2019-12-30

## 2019-12-30 VITALS
HEIGHT: 74 IN | DIASTOLIC BLOOD PRESSURE: 70 MMHG | BODY MASS INDEX: 28.23 KG/M2 | OXYGEN SATURATION: 98 % | HEART RATE: 50 BPM | SYSTOLIC BLOOD PRESSURE: 130 MMHG | WEIGHT: 220 LBS

## 2019-12-30 DIAGNOSIS — Z80.0 FAMILY HISTORY OF COLON CANCER: ICD-10-CM

## 2019-12-30 DIAGNOSIS — Z86.010 HISTORY OF ADENOMATOUS POLYP OF COLON: Primary | ICD-10-CM

## 2019-12-30 DIAGNOSIS — E11.9 TYPE 2 DIABETES MELLITUS WITHOUT COMPLICATION, WITHOUT LONG-TERM CURRENT USE OF INSULIN (HCC): ICD-10-CM

## 2019-12-30 DIAGNOSIS — I10 ESSENTIAL HYPERTENSION: ICD-10-CM

## 2019-12-30 PROCEDURE — 4040F PNEUMOC VAC/ADMIN/RCVD: CPT | Performed by: NURSE PRACTITIONER

## 2019-12-30 PROCEDURE — 3017F COLORECTAL CA SCREEN DOC REV: CPT | Performed by: NURSE PRACTITIONER

## 2019-12-30 PROCEDURE — 3046F HEMOGLOBIN A1C LEVEL >9.0%: CPT | Performed by: NURSE PRACTITIONER

## 2019-12-30 PROCEDURE — G8598 ASA/ANTIPLAT THER USED: HCPCS | Performed by: NURSE PRACTITIONER

## 2019-12-30 PROCEDURE — G8417 CALC BMI ABV UP PARAM F/U: HCPCS | Performed by: NURSE PRACTITIONER

## 2019-12-30 PROCEDURE — G8427 DOCREV CUR MEDS BY ELIG CLIN: HCPCS | Performed by: NURSE PRACTITIONER

## 2019-12-30 PROCEDURE — 2022F DILAT RTA XM EVC RTNOPTHY: CPT | Performed by: NURSE PRACTITIONER

## 2019-12-30 PROCEDURE — 1123F ACP DISCUSS/DSCN MKR DOCD: CPT | Performed by: NURSE PRACTITIONER

## 2019-12-30 PROCEDURE — 99999 PR OFFICE/OUTPT VISIT,PROCEDURE ONLY: CPT | Performed by: NURSE PRACTITIONER

## 2019-12-30 PROCEDURE — 1036F TOBACCO NON-USER: CPT | Performed by: NURSE PRACTITIONER

## 2019-12-30 PROCEDURE — G8482 FLU IMMUNIZE ORDER/ADMIN: HCPCS | Performed by: NURSE PRACTITIONER

## 2019-12-30 RX ORDER — DAPAGLIFLOZIN 10 MG/1
10 TABLET, FILM COATED ORAL EVERY MORNING
COMMUNITY
Start: 2019-12-09

## 2019-12-30 RX ORDER — SITAGLIPTIN 100 MG/1
100 TABLET, FILM COATED ORAL DAILY
COMMUNITY
Start: 2019-11-29

## 2019-12-30 ASSESSMENT — ENCOUNTER SYMPTOMS
CHEST TIGHTNESS: 0
BACK PAIN: 0
COUGH: 0
SHORTNESS OF BREATH: 0
ABDOMINAL PAIN: 0
SORE THROAT: 0
CONSTIPATION: 0
DIARRHEA: 0
VOMITING: 0
ABDOMINAL DISTENTION: 0
VOICE CHANGE: 0
NAUSEA: 0
BLOOD IN STOOL: 0
RECTAL PAIN: 0

## 2020-01-17 ENCOUNTER — HOSPITAL ENCOUNTER (OUTPATIENT)
Age: 70
Setting detail: OUTPATIENT SURGERY
Discharge: HOME | End: 2020-01-17
Attending: INTERNAL MEDICINE | Admitting: INTERNAL MEDICINE
Payer: MEDICARE

## 2020-01-17 ENCOUNTER — APPOINTMENT (OUTPATIENT)
Dept: OPERATING ROOM | Age: 70
End: 2020-01-17

## 2020-01-17 ENCOUNTER — ANESTHESIA (OUTPATIENT)
Dept: OPERATING ROOM | Age: 70
End: 2020-01-17

## 2020-01-17 ENCOUNTER — ANESTHESIA EVENT (OUTPATIENT)
Dept: OPERATING ROOM | Age: 70
End: 2020-01-17

## 2020-01-17 ENCOUNTER — TELEPHONE (OUTPATIENT)
Dept: GASTROENTEROLOGY | Age: 70
End: 2020-01-17

## 2020-01-17 ENCOUNTER — HOSPITAL ENCOUNTER (OUTPATIENT)
Age: 70
Setting detail: SPECIMEN
Discharge: HOME | End: 2020-01-17
Payer: MEDICARE

## 2020-01-17 VITALS
OXYGEN SATURATION: 97 % | HEIGHT: 74 IN | RESPIRATION RATE: 20 BRPM | HEART RATE: 65 BPM | BODY MASS INDEX: 28.25 KG/M2 | DIASTOLIC BLOOD PRESSURE: 71 MMHG | TEMPERATURE: 98 F | SYSTOLIC BLOOD PRESSURE: 129 MMHG

## 2020-01-17 VITALS — DIASTOLIC BLOOD PRESSURE: 74 MMHG | OXYGEN SATURATION: 97 % | SYSTOLIC BLOOD PRESSURE: 117 MMHG

## 2020-01-17 PROCEDURE — G8907 PT DOC NO EVENTS ON DISCHARG: HCPCS

## 2020-01-17 PROCEDURE — 45385 COLONOSCOPY W/LESION REMOVAL: CPT

## 2020-01-17 PROCEDURE — 88305 TISSUE EXAM BY PATHOLOGIST: CPT

## 2020-01-17 PROCEDURE — 45385 COLONOSCOPY W/LESION REMOVAL: CPT | Performed by: INTERNAL MEDICINE

## 2020-01-17 RX ORDER — SODIUM CHLORIDE 9 MG/ML
INJECTION, SOLUTION INTRAVENOUS CONTINUOUS
Status: DISCONTINUED | OUTPATIENT
Start: 2020-01-17 | End: 2020-01-17 | Stop reason: HOSPADM

## 2020-01-17 RX ORDER — LIDOCAINE HYDROCHLORIDE 10 MG/ML
INJECTION, SOLUTION INFILTRATION; PERINEURAL PRN
Status: DISCONTINUED | OUTPATIENT
Start: 2020-01-17 | End: 2020-01-17 | Stop reason: SDUPTHER

## 2020-01-17 RX ORDER — ONDANSETRON 2 MG/ML
4 INJECTION INTRAMUSCULAR; INTRAVENOUS
Status: DISCONTINUED | OUTPATIENT
Start: 2020-01-17 | End: 2020-01-17 | Stop reason: HOSPADM

## 2020-01-17 RX ORDER — PROMETHAZINE HYDROCHLORIDE 25 MG/ML
6.25 INJECTION, SOLUTION INTRAMUSCULAR; INTRAVENOUS
Status: DISCONTINUED | OUTPATIENT
Start: 2020-01-17 | End: 2020-01-17 | Stop reason: HOSPADM

## 2020-01-17 RX ORDER — DIPHENHYDRAMINE HYDROCHLORIDE 50 MG/ML
12.5 INJECTION INTRAMUSCULAR; INTRAVENOUS
Status: DISCONTINUED | OUTPATIENT
Start: 2020-01-17 | End: 2020-01-17 | Stop reason: HOSPADM

## 2020-01-17 RX ORDER — PROPOFOL 10 MG/ML
INJECTION, EMULSION INTRAVENOUS PRN
Status: DISCONTINUED | OUTPATIENT
Start: 2020-01-17 | End: 2020-01-17 | Stop reason: SDUPTHER

## 2020-01-17 RX ADMIN — SODIUM CHLORIDE: 9 INJECTION, SOLUTION INTRAVENOUS at 08:23

## 2020-01-17 RX ADMIN — PROPOFOL 290 MG: 10 INJECTION, EMULSION INTRAVENOUS at 09:37

## 2020-01-17 RX ADMIN — LIDOCAINE HYDROCHLORIDE 50 MG: 10 INJECTION, SOLUTION INFILTRATION; PERINEURAL at 09:37

## 2020-01-17 ASSESSMENT — LIFESTYLE VARIABLES: SMOKING_STATUS: 0

## 2020-01-17 NOTE — TELEPHONE ENCOUNTER
DK,    Per Dr Danish Castellano today:    Findings:   A 7 mm sessile transverse colon polyp was removed by hot snare resection. NO large masses or strictures or colitis. Small polyps or cancers may have been missed in areas covered with stool and not visualized well today.     Suboptimal, incomplete exam not adequate for CRCS due to prep quality as described above.     Moderate PanDiverticulosis in the colon  Internal hemorrhoids-Grade 2  Where it was clearly visible, the mucosa appeared normal throughout the entire examined colon  Retroflexion in the rectum was otherwise normal and revealed no further abnormalities      Recommendations:  1. Repeat colonoscopy: in 1-2 weeks with a two day clear liquid diet and a two day prep  2. Await biopsy results-you will receive a letter with your results within 7-10 days  - Resume previous meds and diet  - GI clinic f/u PRN   - Keep scheduled f/u appts with other MDs      - NO ASA/NSAIDs x 2 weeks     Findings and recommendations were discussed w/ the patient.  A copy of the images was provided.     Donavan Enamorado MD  1/17/2020  9:33 AM

## 2020-01-17 NOTE — TELEPHONE ENCOUNTER
PT HAS BEEN RS FOR REPEAT COLON ON 1/31/20 AT [facility].  TWO DAY PREP AND TWO DAY CLEAR LIQUIDS MAILED TO PT.

## 2020-01-17 NOTE — H&P
Patient Name: Maria Alejandra Reed  : 1950  MRN: [id number]  DATE: 20    Allergies: No Known Allergies     ENDOSCOPY  History and Physical    Procedure:    [] Diagnostic Colonoscopy       [x] Screening Colonoscopy  [] EGD      [] ERCP      [] EUS       [] Other    [x] Previous office notes/History and Physical reviewed from the patients chart. Please see EMR for further details of HPI. I have examined the patient's status immediately prior to the procedure and:      Indications/HPI:    1. History of adenomatous polyp of colon    2. Family hx of colon cancer    []Abdominal Pain   []Cancer- GI/Lung     [x]Fhx of colon CA/polyps  []History of Polyps  []Barretts            []Melena  []Abnormal Imaging              []Dysphagia              []Persistent Pneumonia   []Anemia                            []Food Impaction        [x]History of Polyps  [] GI Bleed             []Pulmonary nodule/Mass   []Change in bowel habits []Heartburn/Reflux  []Rectal Bleed (BRBPR)  []Chest Pain - Non Cardiac []Heme (+) Stool []Ulcers  []Constipation  []Hemoptysis  []Varices  []Diarrhea  []Hypoxemia    []Nausea/Vomiting   []Screening   []Crohns/Colitis  []Other:     Anesthesia:   [x] MAC [] Moderate Sedation   [] General   [] None     ROS: 12 pt Review of Symptoms was negative unless mentioned above    Medications:   Prior to Admission medications    Medication Sig Start Date End Date Taking? Authorizing Provider   FARXIGA 10 MG tablet Take 10 mg by mouth every morning  19  Yes Historical Provider, MD ANDERSENUVIA 100 MG tablet Take 100 mg by mouth daily  19  Yes Historical Provider, MD   lisinopril-hydrochlorothiazide (PRINZIDE;ZESTORETIC) 20-12.5 MG per tablet TAKE 1 TABLET BY MOUTH ONCE DAILY. 18  Yes Rodney Desir MD   metoprolol succinate (TOPROL XL) 50 MG extended release tablet TAKE 1 TABLET BY MOUTH ONCE DAILY.  18  Yes Rodney Desir MD   atorvastatin (LIPITOR) 10 MG tablet TAKE 1 TABLET BY MOUTH []Comments:  Neuro/Mental Status:  [x]WNL  []Comments:  Abdominal:  [x]WNL    []Comments:  Other:   []WNL  []Comments:    Informed Consent:  The risks and benefits of the procedure have been discussed with either the patient or if they cannot consent, their representative. Assessment:  Patient examined and appropriate for planned sedation and procedure. Plan:  Proceed with planned sedation and procedure as above.          Donavan Enamorado MD

## 2020-01-17 NOTE — ANESTHESIA PRE PROCEDURE
Arthritis     Hx of colonic polyps     Hyperglyceridemia     Hypertension     Type 2 diabetes mellitus without complication [facility])        Past Surgical History:        Procedure Laterality Date    ABDOMINAL AORTIC ANEURYSM REPAIR  07/07    hot open    ABDOMINAL AORTIC ANEURYSM REPAIR  07/31/2007    APPENDECTOMY      COLONOSCOPY  07/01/2010    Dr Yusuf Hansen: tubular adenoma x 3    COLONOSCOPY  06/03/2009    Dr Yusuf Hansen:  adenoma x2    COLONOSCOPY  12/10/2008    Dr Yusuf Hansen:  tubular adenoma x2    COLONOSCOPY  05/09/2006    Dr Yusuf Hansen: tubular adenoma x2    COLONOSCOPY  03/22/2005    Dr Yusuf Hansen: tubulovillous adenoma with moderate atypia of rectum    COLONOSCOPY  09/20/2011    Dr Yusuf Hansen: adenomatous polyp    COLONOSCOPY  12/2013    DR Kang: hyperplastic polyp, no recurrence of polyp at tattooed site (3 yr)    COLONOSCOPY  12/28/2016    Dr Darvin Posada, 3 yr recall    CA COLONOSCOPY FLX DX W/COLLJ SPEC WHEN PFRMD N/A 12/28/2016    COLONOSCOPY DIAGNOSTIC OR SCREENING performed by Chalo Cooper DO at [facility]       Social History:    Social History     Tobacco Use    Smoking status: Former Smoker    Smokeless tobacco: Never Used   Substance Use Topics    Alcohol use: Not Currently     Alcohol/week: 1.0 standard drinks     Types: 1 Cans of beer per week     Comment: once every month                                Counseling given: Not Answered      Vital Signs (Current):   Vitals:    01/17/20 0805   BP: 139/82   Pulse: 70   Resp: 20   Temp: 98 °F (36.7 °C)   TempSrc: Tympanic   SpO2: 96%   Height: 6' 2\" (1.88 m)                                              BP Readings from Last 3 Encounters:   01/17/20 139/82   12/30/19 130/70   02/07/18 (!) 142/82       NPO Status: Time of last liquid consumption: 0300                        Time of last solid consumption: 1600                        Date of last liquid consumption: 01/17/20                        Date of last solid food

## 2020-01-31 ENCOUNTER — HOSPITAL ENCOUNTER (OUTPATIENT)
Age: 70
Setting detail: SPECIMEN
Discharge: HOME | End: 2020-01-31
Payer: MEDICARE

## 2020-01-31 ENCOUNTER — HOSPITAL ENCOUNTER (OUTPATIENT)
Age: 70
Setting detail: OUTPATIENT SURGERY
Discharge: HOME | End: 2020-01-31
Attending: INTERNAL MEDICINE | Admitting: INTERNAL MEDICINE

## 2020-01-31 ENCOUNTER — ANESTHESIA (OUTPATIENT)
Dept: OPERATING ROOM | Age: 70
End: 2020-01-31

## 2020-01-31 ENCOUNTER — APPOINTMENT (OUTPATIENT)
Dept: OPERATING ROOM | Age: 70
End: 2020-01-31

## 2020-01-31 ENCOUNTER — ANESTHESIA EVENT (OUTPATIENT)
Dept: OPERATING ROOM | Age: 70
End: 2020-01-31

## 2020-01-31 VITALS
OXYGEN SATURATION: 98 % | HEART RATE: 71 BPM | HEIGHT: 74 IN | SYSTOLIC BLOOD PRESSURE: 116 MMHG | WEIGHT: 230 LBS | RESPIRATION RATE: 18 BRPM | BODY MASS INDEX: 29.52 KG/M2 | DIASTOLIC BLOOD PRESSURE: 76 MMHG

## 2020-01-31 VITALS — OXYGEN SATURATION: 97 % | SYSTOLIC BLOOD PRESSURE: 113 MMHG | DIASTOLIC BLOOD PRESSURE: 69 MMHG

## 2020-01-31 PROCEDURE — 88305 TISSUE EXAM BY PATHOLOGIST: CPT

## 2020-01-31 PROCEDURE — 45385 COLONOSCOPY W/LESION REMOVAL: CPT

## 2020-01-31 RX ORDER — PROPOFOL 10 MG/ML
INJECTION, EMULSION INTRAVENOUS PRN
Status: DISCONTINUED | OUTPATIENT
Start: 2020-01-31 | End: 2020-01-31 | Stop reason: SDUPTHER

## 2020-01-31 RX ORDER — SODIUM CHLORIDE, SODIUM LACTATE, POTASSIUM CHLORIDE, CALCIUM CHLORIDE 600; 310; 30; 20 MG/100ML; MG/100ML; MG/100ML; MG/100ML
INJECTION, SOLUTION INTRAVENOUS CONTINUOUS PRN
Status: DISCONTINUED | OUTPATIENT
Start: 2020-01-31 | End: 2020-01-31 | Stop reason: SDUPTHER

## 2020-01-31 RX ORDER — LIDOCAINE HYDROCHLORIDE 10 MG/ML
INJECTION, SOLUTION INFILTRATION; PERINEURAL PRN
Status: DISCONTINUED | OUTPATIENT
Start: 2020-01-31 | End: 2020-01-31 | Stop reason: SDUPTHER

## 2020-01-31 RX ORDER — ONDANSETRON 2 MG/ML
4 INJECTION INTRAMUSCULAR; INTRAVENOUS
Status: DISCONTINUED | OUTPATIENT
Start: 2020-01-31 | End: 2020-01-31 | Stop reason: HOSPADM

## 2020-01-31 RX ORDER — PROMETHAZINE HYDROCHLORIDE 25 MG/ML
6.25 INJECTION, SOLUTION INTRAMUSCULAR; INTRAVENOUS
Status: DISCONTINUED | OUTPATIENT
Start: 2020-01-31 | End: 2020-01-31 | Stop reason: HOSPADM

## 2020-01-31 RX ORDER — DIPHENHYDRAMINE HYDROCHLORIDE 50 MG/ML
12.5 INJECTION INTRAMUSCULAR; INTRAVENOUS
Status: DISCONTINUED | OUTPATIENT
Start: 2020-01-31 | End: 2020-01-31 | Stop reason: HOSPADM

## 2020-01-31 RX ORDER — SODIUM CHLORIDE 9 MG/ML
INJECTION, SOLUTION INTRAVENOUS CONTINUOUS
Status: DISCONTINUED | OUTPATIENT
Start: 2020-01-31 | End: 2020-01-31 | Stop reason: HOSPADM

## 2020-01-31 RX ADMIN — PROPOFOL 400 MG: 10 INJECTION, EMULSION INTRAVENOUS at 11:57

## 2020-01-31 RX ADMIN — SODIUM CHLORIDE, SODIUM LACTATE, POTASSIUM CHLORIDE, CALCIUM CHLORIDE: 600; 310; 30; 20 INJECTION, SOLUTION INTRAVENOUS at 11:44

## 2020-01-31 RX ADMIN — SODIUM CHLORIDE: 9 INJECTION, SOLUTION INTRAVENOUS at 11:17

## 2020-01-31 RX ADMIN — LIDOCAINE HYDROCHLORIDE 5 ML: 10 INJECTION, SOLUTION INFILTRATION; PERINEURAL at 11:57

## 2020-01-31 ASSESSMENT — LIFESTYLE VARIABLES: SMOKING_STATUS: 0

## 2020-01-31 NOTE — ANESTHESIA PRE PROCEDURE
without complication [facility])        Past Surgical History:        Procedure Laterality Date    ABDOMINAL AORTIC ANEURYSM REPAIR  07/07    hot open    ABDOMINAL AORTIC ANEURYSM REPAIR  07/31/2007    APPENDECTOMY      COLONOSCOPY  07/01/2010    Dr Nadeem Orozco: tubular adenoma x 3    COLONOSCOPY  06/03/2009    Dr Nadeem Orozco:  adenoma x2    COLONOSCOPY  12/10/2008    Dr Nadeem Orozco:  tubular adenoma x2    COLONOSCOPY  05/09/2006    Dr Nadeem Orozco: tubular adenoma x2    COLONOSCOPY  03/22/2005    Dr Nadeem Oorzco: tubulovillous adenoma with moderate atypia of rectum    COLONOSCOPY  09/20/2011    Dr Nadeem Orozco: adenomatous polyp    COLONOSCOPY  12/2013    DR Kang: hyperplastic polyp, no recurrence of polyp at tattooed site (3 yr)    COLONOSCOPY N/A 1/17/2020    Dr Delmar Grimes prep, pan-diverticulosis, internal hemorrhoids-Grade 2, AP, repeat in 1-2 wks    CO COLONOSCOPY FLX DX W/COLLJ SPEC WHEN PFRMD N/A 12/28/2016    Dr Rajani Nuno, 3 yr recall       Social History:    Social History     Tobacco Use    Smoking status: Former Smoker    Smokeless tobacco: Never Used   Substance Use Topics    Alcohol use: Not Currently     Alcohol/week: 1.0 standard drinks     Types: 1 Cans of beer per week     Comment: once every month                                Counseling given: Not Answered      Vital Signs (Current): There were no vitals filed for this visit.                                            BP Readings from Last 3 Encounters:   01/17/20 129/71   01/17/20 117/74   12/30/19 130/70       NPO Status:                                                                                 BMI:   Wt Readings from Last 3 Encounters:   12/30/19 220 lb (99.8 kg)   02/07/18 215 lb (97.5 kg)   07/11/17 224 lb (101.6 kg)     There is no height or weight on file to calculate BMI.    CBC:   Lab Results   Component Value Date    WBC 6.1 01/29/2018    RBC 4.94 01/29/2018    HGB 13.6 01/29/2018    HCT 43.9 01/29/2018 MCV 88.9 01/29/2018    RDW 12.4 01/29/2018     01/29/2018       CMP:   Lab Results   Component Value Date     01/29/2018    K 4.0 01/29/2018     01/29/2018    CO2 26 01/29/2018    BUN 16 01/29/2018    CREATININE 0.9 01/29/2018    LABGLOM >60 01/29/2018    GLUCOSE 176 01/29/2018    PROT 7.0 01/29/2018    CALCIUM 9.4 01/29/2018    BILITOT 0.3 01/29/2018    ALKPHOS 47 01/29/2018    AST 21 01/29/2018    ALT 23 01/29/2018       POC Tests: No results for input(s): POCGLU, POCNA, POCK, POCCL, POCBUN, POCHEMO, POCHCT in the last 72 hours. Coags: No results found for: PROTIME, INR, APTT    HCG (If Applicable): No results found for: PREGTESTUR, PREGSERUM, HCG, HCGQUANT     ABGs: No results found for: PHART, PO2ART, XSD8PLO, QOO2PUS, BEART, X6OEYHNY     Type & Screen (If Applicable):  No results found for: LABABO, [address]    Anesthesia Evaluation  Patient summary reviewed and Nursing notes reviewed no history of anesthetic complications:   Airway: Mallampati: II  TM distance: >3 FB   Neck ROM: full  Mouth opening: > = 3 FB Dental:    (+) partials      Pulmonary:normal exam        (-) not a current smoker                          ROS comment: Former smoker   Cardiovascular:  Exercise tolerance: good (>4 METS),   (+) hypertension:, hyperlipidemia               ROS comment: Endo AAA in 2007     Neuro/Psych:   Negative Neuro/Psych ROS              GI/Hepatic/Renal: Neg GI/Hepatic/Renal ROS            Endo/Other:    (+) Diabetes, . Abdominal:           Vascular:                                          Anesthesia Plan      general and TIVA     ASA 2       Induction: intravenous. Anesthetic plan and risks discussed with patient.                       LEROY Rueda - CRNA   1/31/2020

## 2020-01-31 NOTE — ANESTHESIA POSTPROCEDURE EVALUATION
Department of Anesthesiology  Postprocedure Note    Patient: Georgeanne Klinefelter  MRN: [id number]  YOB: 1950  Date of evaluation: 1/31/2020  Time:  12:13 PM     Procedure Summary     Date:  01/31/20 Room / Location:  [facility] ENDO 01 / [address]    Anesthesia Start:  5972 Anesthesia Stop:  4062    Procedure:  COLONOSCOPY POLYPECTOMY REMOVAL SNARE/STOMA (N/A Abdomen) Diagnosis:  (SCREEN - HX AP)    Surgeon:  Ambrose Maldonado MD Responsible Provider:  LEROY Yusuf CRNA    Anesthesia Type:  general, TIVA ASA Status:  2          Anesthesia Type: general, TIVA    Sanchez Phase I:      Sanchez Phase II:      Last vitals: Reviewed and per EMR flowsheets.        Anesthesia Post Evaluation    Patient location during evaluation: bedside  Patient participation: complete - patient participated  Level of consciousness: sleepy but conscious  Pain score: 0  Airway patency: patent  Nausea & Vomiting: no nausea and no vomiting  Complications: no  Cardiovascular status: hemodynamically stable  Respiratory status: acceptable  Hydration status: euvolemic

## 2020-01-31 NOTE — OP NOTE
Patient: Dani Bolaños: 1950  Med Rec#: [id number] Acc#: [id number]   Primary Care Provider Jayne Cutler MD    Date of Procedure:  1/31/2020    Endoscopist: Richard Giles MD    Referring Provider: Jayne Cutler MD,     Operation Performed: Colonoscopy up to the Cecum with hot snare resection of two polyps    Indications:   1. History of adenomatous polyp of colon    2. Family hx of colon cancer  3. Recent attempt was incomplete and inadequate due to poor prep    Anesthesia:  Sedation was administered by anesthesia who monitored the patient during the procedure. I met with Jamie Farrell prior to procedure. We discussed the procedure itself, and I have discussed the risks of endoscopy (including-- but not limited to-- pain, discomfort, bleeding potentially requiring second endoscopic procedure and/or blood transfusion, organ perforation requiring operative repair, damage to organs near the colon, infection, aspiration, cardiopulmonary/allergic reaction), benefits, indications to endoscopy. Additionally, we discussed options other than colonoscopy. The patient expressed understanding. All questions answered. The patient decided to proceed with the procedure. Signed informed consent was placed on the chart. Blood Loss: minimal    Withdrawal time: >9 mins  Bowel Prep: adequate and good    Complications: no immediate complications    DESCRIPTION OF PROCEDURE:     A time out was performed. After written informed consent was obtained, the patient was placed in the left lateral position. The perianal area was inspected, and a digital rectal exam was performed. A rectal exam was performed: normal tone, no palpable lesions. At this point, a forward viewing Olympus colonoscope was inserted into the anus and carefully advanced to the Cecum. The cecum was identified by the ileocecal valve and the appendiceal orifice.  The colonoscope was then slowly withdrawn with careful inspection of

## 2020-06-04 ENCOUNTER — APPOINTMENT (OUTPATIENT)
Dept: ULTRASOUND IMAGING | Facility: HOSPITAL | Age: 70
End: 2020-06-04

## 2020-06-08 ENCOUNTER — TELEPHONE (OUTPATIENT)
Dept: VASCULAR SURGERY | Facility: CLINIC | Age: 70
End: 2020-06-08

## 2020-06-08 NOTE — TELEPHONE ENCOUNTER
Left message reminding Mr Israel of his appointment for Tuesday, June 9th, 2020. Reminded [facility] with nothing to eat or drink 6 hours prior and follow up afterwards at 945 am with Dr Selby. Also advised if he had any questions to please call the office at [phone].

## 2020-06-09 ENCOUNTER — HOSPITAL ENCOUNTER (OUTPATIENT)
Dept: ULTRASOUND IMAGING | Facility: HOSPITAL | Age: 70
Discharge: HOME | End: 2020-06-09

## 2020-06-09 ENCOUNTER — OFFICE VISIT (OUTPATIENT)
Dept: VASCULAR SURGERY | Facility: CLINIC | Age: 70
End: 2020-06-09

## 2020-06-09 ENCOUNTER — HOSPITAL ENCOUNTER (OUTPATIENT)
Dept: ULTRASOUND IMAGING | Facility: HOSPITAL | Age: 70
Discharge: HOME | End: 2020-06-09
Admitting: NURSE PRACTITIONER

## 2020-06-09 VITALS
HEIGHT: 74 IN | SYSTOLIC BLOOD PRESSURE: 132 MMHG | OXYGEN SATURATION: 97 % | DIASTOLIC BLOOD PRESSURE: 74 MMHG | HEART RATE: 59 BPM | WEIGHT: 218 LBS | BODY MASS INDEX: 27.98 KG/M2

## 2020-06-09 DIAGNOSIS — I72.4 POPLITEAL ARTERY ANEURYSM (HCC): ICD-10-CM

## 2020-06-09 DIAGNOSIS — I72.4 BILATERAL POPLITEAL ARTERY ANEURYSM (HCC): ICD-10-CM

## 2020-06-09 DIAGNOSIS — I65.23 BILATERAL CAROTID ARTERY STENOSIS: ICD-10-CM

## 2020-06-09 DIAGNOSIS — I71.40 ABDOMINAL AORTIC ANEURYSM (AAA) WITHOUT RUPTURE (HCC): Primary | ICD-10-CM

## 2020-06-09 DIAGNOSIS — I10 ESSENTIAL HYPERTENSION: ICD-10-CM

## 2020-06-09 DIAGNOSIS — E78.2 MIXED HYPERLIPIDEMIA: ICD-10-CM

## 2020-06-09 DIAGNOSIS — I71.40 ABDOMINAL AORTIC ANEURYSM (AAA) WITHOUT RUPTURE (HCC): ICD-10-CM

## 2020-06-09 PROCEDURE — 93880 EXTRACRANIAL BILAT STUDY: CPT

## 2020-06-09 PROCEDURE — 93923 UPR/LXTR ART STDY 3+ LVLS: CPT | Performed by: SURGERY

## 2020-06-09 PROCEDURE — 93923 UPR/LXTR ART STDY 3+ LVLS: CPT

## 2020-06-09 PROCEDURE — 76775 US EXAM ABDO BACK WALL LIM: CPT

## 2020-06-09 PROCEDURE — 99214 OFFICE O/P EST MOD 30 MIN: CPT | Performed by: SURGERY

## 2020-06-09 PROCEDURE — 93880 EXTRACRANIAL BILAT STUDY: CPT | Performed by: SURGERY

## 2020-06-09 NOTE — PROGRESS NOTES
"6/9/2020       Jeremiah Amanda MD  [address] [address]  [place] [address]        Dayna Israel  1950    Chief Complaint   Patient presents with   • Follow-up     1 yr f/u with US of aorta, carotid testing and US arterial doppler of BLE.  Pt states that his legs are doing about the same. Pt denies any stroke like symptoms.        Dear Jeremiah Amanda MD:    HPI     I had the pleasure of seeing you patient in the office today for follow up.  As you recall, the patient is a 69 y.o. male who was a previous patient of Dr. Ricks.  He had a previously repaired 4.9 cm abdominal aortic aneurysm.  We have been following with ultrasound.  He denies any abdominal pain, strokelike symptoms or claudication symptoms to his lower extremities.  He is maintained on aspirin and Lipitor.  He did have noninvasive testing performed today in which I personally reviewed.    Review of Systems   Constitutional: Negative.    HENT: Negative.    Eyes: Negative.    Respiratory: Negative.    Cardiovascular: Negative.    Gastrointestinal: Negative.    Endocrine: Negative.    Genitourinary: Negative.    Musculoskeletal: Negative.    Skin: Negative.    Allergic/Immunologic: Negative.    Neurological: Negative.    Hematological: Negative.    Psychiatric/Behavioral: Negative.    All other systems reviewed and are negative.       /74   Pulse 59   Ht 188 cm (74\")   Wt 98.9 kg (218 lb)   SpO2 97%   BMI 27.99 kg/m²   Physical Exam   Constitutional: He is oriented to person, place, and time. He appears well-developed and well-nourished.   HENT:   Head: Normocephalic and atraumatic.   Eyes: Pupils are equal, round, and reactive to light. No scleral icterus.   Neck: Normal range of motion. Neck supple. No thyromegaly present.   Cardiovascular: Normal rate, regular rhythm, normal heart sounds and intact distal pulses.   Prominent popliteals   Pulmonary/Chest: Effort normal and breath sounds normal.   Abdominal: Soft. " Bowel sounds are normal.   Musculoskeletal: Normal range of motion.   Neurological: He is alert and oriented to person, place, and time.   Skin: Skin is warm and dry.   Psychiatric: He has a normal mood and affect. His behavior is normal. Judgment and thought content normal.   Nursing note and vitals reviewed.      DIAGNOSTIC DATA:  Us Aorta Limited    Result Date: 6/9/2020  Narrative: EXAM: US AORTA LIMITED- - 6/9/2020 7:45 AM CDT  HISTORY: AAA; I71.4-Abdominal aortic aneurysm, without rupture   COMPARISON: 6/20/2019.  TECHNIQUE: Real-time grayscale and color ultrasound performed with representative images saved to PACS.  FINDINGS:  Proximal aorta measures 2.0 x 2.5 cm. Previously 2.3 x 2.3 cm on 6/20/2019. Mid aorta measures 2.2 x 2.8 cm. Previously 2.1 x 2.1 cm on 6/20/2019. Distal aorta measures 4.1 x 4.3 cm with changes of repair. Previously 4.2 x 4.2 cm.  Right common iliac measures 1.4 cm. Previously 1.1 cm. Left common iliac measures 1.3 cm. Previously 1.2 cm.       Impression: 1. Abdominal aortic aneurysm with changes of repair. Native aneurysm sac measures 4.1 x 4.3 cm, similar compared to 6/20/2019.  This report was finalized on 06/09/2020 08:55 by Dr Briana Galeana MD.     Kalyan pop art scan  RIGHT prox 0.7   Mid  0.9    Distal  0.7   LUCAS  1.2cm  LEFT  prox  0.7   Mid  0.7    Distal  0.9   LUCAS  1.0cm      Carotid US: Less than 50% carotid stenosis bilaterally with bilateral antegrade vertebral flow     Patient Active Problem List   Diagnosis   • Small bilateral Popliteal Artery Aneurysm   • Hypertension   • Hyperlipidemia   • AAA (abdominal aortic aneurysm) (CMS/[facility])   • Carotid stenosis   • Diabetes (CMS/HCC)         ICD-10-CM ICD-9-CM   1. Abdominal aortic aneurysm (AAA) without rupture (CMS/HCC) I71.4 441.4   2. Small bilateral Popliteal Artery Aneurysm I72.4 442.3   3. Essential hypertension I10 401.9   4. Mixed hyperlipidemia E78.2 272.2   5. Bilateral carotid artery stenosis I65.23 433.10      433.30         PLAN: After thoroughly evaluating Dayna Israel, I believe the best course of action is to remain conservative from a vascular standpoint.  I did review his testing and aneurysm is slightly smaller measuring 4.2 cm, his popliteal arteries are unchanged, and carotid duplex is less than 50% stenosis bilaterally.  We will see Dayna Israel back in 1 year with repeat noninvasive testing for continued surveillance, including a popliteal duplex, carotid duplex, and ultrasound aorta.  I did discuss vascular risk factors as they pertain to the progression of vascular disease including controlling his hypertension, hyperlipidemia, and diabetes mellitus.  His blood pressure appears well controlled on his current medication regimen.  He is maintained on Lipitor for his hyperlipidemia. Body mass index is 27.99 kg/m².   BMI chart given.  The patient is to continue taking their medications as previously discussed.   This was all discussed in full with complete understanding.  Thank you for allowing me to participate in the care of your patient.  Please do not hesitate to call with any questions or concerns.  We will keep you aware of any further encounters with Dayna Israel.      Sincerely Yours,      River Sebly, DO

## 2021-05-25 ENCOUNTER — HOSPITAL ENCOUNTER (OUTPATIENT)
Dept: ULTRASOUND IMAGING | Facility: HOSPITAL | Age: 71
Discharge: HOME | End: 2021-05-25

## 2021-05-25 DIAGNOSIS — I72.4 POPLITEAL ARTERY ANEURYSM (HCC): ICD-10-CM

## 2021-05-25 DIAGNOSIS — I71.40 ABDOMINAL AORTIC ANEURYSM (AAA) WITHOUT RUPTURE (HCC): ICD-10-CM

## 2021-05-25 DIAGNOSIS — I65.23 BILATERAL CAROTID ARTERY STENOSIS: ICD-10-CM

## 2021-05-25 PROCEDURE — 93880 EXTRACRANIAL BILAT STUDY: CPT | Performed by: SURGERY

## 2021-05-25 PROCEDURE — 93923 UPR/LXTR ART STDY 3+ LVLS: CPT | Performed by: SURGERY

## 2021-05-25 PROCEDURE — 93923 UPR/LXTR ART STDY 3+ LVLS: CPT

## 2021-05-25 PROCEDURE — 76775 US EXAM ABDO BACK WALL LIM: CPT

## 2021-05-25 PROCEDURE — 93880 EXTRACRANIAL BILAT STUDY: CPT

## 2021-05-26 ENCOUNTER — APPOINTMENT (OUTPATIENT)
Dept: ULTRASOUND IMAGING | Facility: HOSPITAL | Age: 71
End: 2021-05-26

## 2021-05-26 ENCOUNTER — TELEPHONE (OUTPATIENT)
Dept: VASCULAR SURGERY | Facility: CLINIC | Age: 71
End: 2021-05-26

## 2021-05-26 NOTE — TELEPHONE ENCOUNTER
Left message reminding Mr Israel of his appointment for Thursday, May 27th, 2021 at 9 am with Katharina PIRES as Dr Selby will be in surgery. I did advise Mr Israel if he had any questions, concerns or needs to reschedule to please call the office at [phone].

## 2021-07-18 ENCOUNTER — APPOINTMENT (OUTPATIENT)
Dept: GENERAL RADIOLOGY | Age: 71
DRG: 310 | End: 2021-07-18
Payer: MEDICARE

## 2021-07-18 ENCOUNTER — HOSPITAL ENCOUNTER (INPATIENT)
Age: 71
LOS: 1 days | Discharge: HOME | DRG: 310 | End: 2021-07-19
Attending: EMERGENCY MEDICINE | Admitting: HOSPITALIST
Payer: MEDICARE

## 2021-07-18 DIAGNOSIS — I48.92 ATRIAL FLUTTER WITH RAPID VENTRICULAR RESPONSE (HCC): Primary | ICD-10-CM

## 2021-07-18 LAB
ANION GAP SERPL CALCULATED.3IONS-SCNC: 16 MMOL/L (ref 7–19)
BUN BLDV-MCNC: 12 MG/DL (ref 8–23)
CALCIUM SERPL-MCNC: 9.3 MG/DL (ref 8.8–10.2)
CHLORIDE BLD-SCNC: 103 MMOL/L (ref 98–111)
CO2: 23 MMOL/L (ref 22–29)
CREAT SERPL-MCNC: 0.8 MG/DL (ref 0.5–1.2)
GFR AFRICAN AMERICAN: >59
GFR NON-AFRICAN AMERICAN: >60
GLUCOSE BLD-MCNC: 146 MG/DL (ref 70–99)
GLUCOSE BLD-MCNC: 150 MG/DL (ref 74–109)
GLUCOSE BLD-MCNC: 211 MG/DL (ref 70–99)
GLUCOSE BLD-MCNC: 232 MG/DL (ref 70–99)
HCT VFR BLD CALC: 49.8 % (ref 42–52)
HEMOGLOBIN: 16 G/DL (ref 14–18)
INR BLD: 0.94 (ref 0.88–1.18)
MCH RBC QN AUTO: 27.6 PG (ref 27–31)
MCHC RBC AUTO-ENTMCNC: 32.1 G/DL (ref 33–37)
MCV RBC AUTO: 85.9 FL (ref 80–94)
PDW BLD-RTO: 13.9 % (ref 11.5–14.5)
PERFORMED ON: ABNORMAL
PLATELET # BLD: 264 K/UL (ref 130–400)
PMV BLD AUTO: 10.5 FL (ref 9.4–12.4)
POTASSIUM REFLEX MAGNESIUM: 4 MMOL/L (ref 3.5–5)
PROTHROMBIN TIME: 12.8 SEC (ref 12–14.6)
RBC # BLD: 5.8 M/UL (ref 4.7–6.1)
SARS-COV-2, NAAT: NOT DETECTED
SODIUM BLD-SCNC: 142 MMOL/L (ref 136–145)
TSH REFLEX FT4: 1.65 UIU/ML (ref 0.35–5.5)
WBC # BLD: 8.9 K/UL (ref 4.8–10.8)

## 2021-07-18 PROCEDURE — 82947 ASSAY GLUCOSE BLOOD QUANT: CPT

## 2021-07-18 PROCEDURE — 96374 THER/PROPH/DIAG INJ IV PUSH: CPT

## 2021-07-18 PROCEDURE — 6370000000 HC RX 637 (ALT 250 FOR IP): Performed by: HOSPITALIST

## 2021-07-18 PROCEDURE — 99283 EMERGENCY DEPT VISIT LOW MDM: CPT

## 2021-07-18 PROCEDURE — 2580000003 HC RX 258: Performed by: EMERGENCY MEDICINE

## 2021-07-18 PROCEDURE — 80048 BASIC METABOLIC PNL TOTAL CA: CPT

## 2021-07-18 PROCEDURE — 85610 PROTHROMBIN TIME: CPT

## 2021-07-18 PROCEDURE — 85027 COMPLETE CBC AUTOMATED: CPT

## 2021-07-18 PROCEDURE — 6360000002 HC RX W HCPCS: Performed by: EMERGENCY MEDICINE

## 2021-07-18 PROCEDURE — 36415 COLL VENOUS BLD VENIPUNCTURE: CPT

## 2021-07-18 PROCEDURE — 87635 SARS-COV-2 COVID-19 AMP PRB: CPT

## 2021-07-18 PROCEDURE — 2580000003 HC RX 258: Performed by: HOSPITALIST

## 2021-07-18 PROCEDURE — 71045 X-RAY EXAM CHEST 1 VIEW: CPT

## 2021-07-18 PROCEDURE — 2500000003 HC RX 250 WO HCPCS: Performed by: EMERGENCY MEDICINE

## 2021-07-18 PROCEDURE — 84443 ASSAY THYROID STIM HORMONE: CPT

## 2021-07-18 PROCEDURE — 2140000000 HC CCU INTERMEDIATE R&B

## 2021-07-18 PROCEDURE — 6360000002 HC RX W HCPCS: Performed by: HOSPITALIST

## 2021-07-18 RX ORDER — ACETAMINOPHEN 650 MG/1
650 SUPPOSITORY RECTAL EVERY 6 HOURS PRN
Status: DISCONTINUED | OUTPATIENT
Start: 2021-07-18 | End: 2021-07-19 | Stop reason: HOSPADM

## 2021-07-18 RX ORDER — ASPIRIN 81 MG/1
81 TABLET ORAL DAILY
Status: DISCONTINUED | OUTPATIENT
Start: 2021-07-19 | End: 2021-07-19 | Stop reason: HOSPADM

## 2021-07-18 RX ORDER — DILTIAZEM HYDROCHLORIDE 5 MG/ML
0.25 INJECTION INTRAVENOUS ONCE
Status: COMPLETED | OUTPATIENT
Start: 2021-07-18 | End: 2021-07-18

## 2021-07-18 RX ORDER — SODIUM CHLORIDE 0.9 % (FLUSH) 0.9 %
5-40 SYRINGE (ML) INJECTION EVERY 12 HOURS SCHEDULED
Status: DISCONTINUED | OUTPATIENT
Start: 2021-07-18 | End: 2021-07-19 | Stop reason: HOSPADM

## 2021-07-18 RX ORDER — ATORVASTATIN CALCIUM 40 MG/1
40 TABLET, FILM COATED ORAL DAILY
Status: DISCONTINUED | OUTPATIENT
Start: 2021-07-18 | End: 2021-07-19 | Stop reason: HOSPADM

## 2021-07-18 RX ORDER — METOPROLOL SUCCINATE 50 MG/1
50 TABLET, EXTENDED RELEASE ORAL DAILY
Status: DISCONTINUED | OUTPATIENT
Start: 2021-07-19 | End: 2021-07-19

## 2021-07-18 RX ORDER — INSULIN GLARGINE 100 [IU]/ML
10 INJECTION, SOLUTION SUBCUTANEOUS NIGHTLY
Status: DISCONTINUED | OUTPATIENT
Start: 2021-07-18 | End: 2021-07-19 | Stop reason: HOSPADM

## 2021-07-18 RX ORDER — SODIUM CHLORIDE 0.9 % (FLUSH) 0.9 %
5-40 SYRINGE (ML) INJECTION PRN
Status: DISCONTINUED | OUTPATIENT
Start: 2021-07-18 | End: 2021-07-19 | Stop reason: HOSPADM

## 2021-07-18 RX ORDER — ONDANSETRON 2 MG/ML
4 INJECTION INTRAMUSCULAR; INTRAVENOUS EVERY 6 HOURS PRN
Status: DISCONTINUED | OUTPATIENT
Start: 2021-07-18 | End: 2021-07-19 | Stop reason: HOSPADM

## 2021-07-18 RX ORDER — DEXTROSE MONOHYDRATE 50 MG/ML
100 INJECTION, SOLUTION INTRAVENOUS PRN
Status: DISCONTINUED | OUTPATIENT
Start: 2021-07-18 | End: 2021-07-19 | Stop reason: HOSPADM

## 2021-07-18 RX ORDER — POLYETHYLENE GLYCOL 3350 17 G/17G
17 POWDER, FOR SOLUTION ORAL DAILY PRN
Status: DISCONTINUED | OUTPATIENT
Start: 2021-07-18 | End: 2021-07-19 | Stop reason: HOSPADM

## 2021-07-18 RX ORDER — NICOTINE POLACRILEX 4 MG
15 LOZENGE BUCCAL PRN
Status: DISCONTINUED | OUTPATIENT
Start: 2021-07-18 | End: 2021-07-19 | Stop reason: HOSPADM

## 2021-07-18 RX ORDER — ONDANSETRON 4 MG/1
4 TABLET, ORALLY DISINTEGRATING ORAL EVERY 8 HOURS PRN
Status: DISCONTINUED | OUTPATIENT
Start: 2021-07-18 | End: 2021-07-19 | Stop reason: HOSPADM

## 2021-07-18 RX ORDER — ACETAMINOPHEN 325 MG/1
650 TABLET ORAL EVERY 6 HOURS PRN
Status: DISCONTINUED | OUTPATIENT
Start: 2021-07-18 | End: 2021-07-19 | Stop reason: HOSPADM

## 2021-07-18 RX ORDER — SODIUM CHLORIDE 9 MG/ML
25 INJECTION, SOLUTION INTRAVENOUS PRN
Status: DISCONTINUED | OUTPATIENT
Start: 2021-07-18 | End: 2021-07-19 | Stop reason: HOSPADM

## 2021-07-18 RX ORDER — DEXTROSE MONOHYDRATE 25 G/50ML
12.5 INJECTION, SOLUTION INTRAVENOUS PRN
Status: DISCONTINUED | OUTPATIENT
Start: 2021-07-18 | End: 2021-07-19 | Stop reason: HOSPADM

## 2021-07-18 RX ADMIN — INSULIN LISPRO 4 UNITS: 100 INJECTION, SOLUTION INTRAVENOUS; SUBCUTANEOUS at 17:54

## 2021-07-18 RX ADMIN — DEXTROSE MONOHYDRATE 150 MG: 50 INJECTION, SOLUTION INTRAVENOUS at 20:53

## 2021-07-18 RX ADMIN — ATORVASTATIN CALCIUM 40 MG: 40 TABLET, FILM COATED ORAL at 17:54

## 2021-07-18 RX ADMIN — DILTIAZEM HYDROCHLORIDE 24.5 MG: 5 INJECTION INTRAVENOUS at 12:20

## 2021-07-18 RX ADMIN — DILTIAZEM HYDROCHLORIDE 5 MG/HR: 5 INJECTION INTRAVENOUS at 12:48

## 2021-07-18 RX ADMIN — ENOXAPARIN SODIUM 100 MG: 100 INJECTION SUBCUTANEOUS at 13:10

## 2021-07-18 RX ADMIN — APIXABAN 5 MG: 5 TABLET, FILM COATED ORAL at 23:15

## 2021-07-18 RX ADMIN — SODIUM CHLORIDE, PRESERVATIVE FREE 10 ML: 5 INJECTION INTRAVENOUS at 22:00

## 2021-07-18 RX ADMIN — INSULIN GLARGINE 10 UNITS: 100 INJECTION, SOLUTION SUBCUTANEOUS at 23:15

## 2021-07-18 ASSESSMENT — PAIN SCALES - GENERAL
PAINLEVEL_OUTOF10: 0
PAINLEVEL_OUTOF10: 0

## 2021-07-18 NOTE — H&P
HISTORY AND PHYSICAL             Date: 7/18/2021        Patient Name: Manolo Espinal     YOB: 1950      Age:  79 y.o. Chief Complaint     Chief Complaint   Patient presents with    Palpitations     presents with c/o palpitations         History Obtained From   patient    History of Present Illness     70-year-old male with a history of type 2 diabetes, arthritis, hypertension, who presents to the hospital with concerns of indigestion. Patient stated that yesterday he felt he was having some reflux symptoms, denied any chest pain, shortness of breath, dizziness, blurry vision, or diaphoresis. Checked his pulse at home and found her to be elevated in the 130/140. Stated this morning when he woke up his heart rate was still in the 140s, was encouraged by his daughter to present to the emergency room for evaluation. In the ED blood pressure remarkable, however was noted to have a heart rate in the 140s with EKG reading of atrial fibrillation flutter. Patient was given Cardizem bolus with improvement in heart rate initiated on the drip and admitted for further evaluation.         Past Medical History     Past Medical History:   Diagnosis Date    Arthritis     Hx of colonic polyps     Hyperglyceridemia     Hypertension     Type 2 diabetes mellitus without complication [facility])         Past Surgical History     Past Surgical History:   Procedure Laterality Date    ABDOMINAL AORTIC ANEURYSM REPAIR  07/07    hot open    ABDOMINAL AORTIC ANEURYSM REPAIR  07/31/2007    APPENDECTOMY      COLONOSCOPY  07/01/2010    Dr Teddy Brand: tubular adenoma x 3    COLONOSCOPY  06/03/2009    Dr Teddy Brand:  adenoma x2    COLONOSCOPY  12/10/2008    Dr Teddy Brand:  tubular adenoma x2    COLONOSCOPY  05/09/2006    Dr Teddy Brand: tubular adenoma x2    COLONOSCOPY  03/22/2005    Dr Teddy Brand: tubulovillous adenoma with moderate atypia of rectum    COLONOSCOPY  09/20/2011    Dr Teddy Brand: adenomatous polyp  COLONOSCOPY  12/2013    DR Kang: hyperplastic polyp, no recurrence of polyp at tattooed site (3 yr)    COLONOSCOPY N/A 1/17/2020    Dr Deandre Floyd prep, pan-diverticulosis, internal hemorrhoids-Grade 2, AP, repeat in 1-2 wks    COLONOSCOPY  01/31/2020    Dr Leif Novak hemorrhoids-Grade 2, diverticulosis-AP x 2, 3 yr recall    [place] COLONOSCOPY FLX DX W/COLLJ SPEC WHEN PFRMD N/A 12/28/2016    Dr Costa Doctor, 3 yr recall        Medications Prior to Admission     Prior to Admission medications    Medication Sig Start Date End Date Taking? Authorizing Provider   FARXIGA 10 MG tablet Take 10 mg by mouth every morning  12/9/19  Yes Historical Provider, MD   JANUVIA 100 MG tablet Take 100 mg by mouth daily  11/29/19  Yes Historical Provider, MD   lisinopril-hydrochlorothiazide (PRINZIDE;ZESTORETIC) 20-12.5 MG per tablet TAKE 1 TABLET BY MOUTH ONCE DAILY. 6/14/18  Yes Wayne Mixon MD   metoprolol succinate (TOPROL XL) 50 MG extended release tablet TAKE 1 TABLET BY MOUTH ONCE DAILY. 6/14/18  Yes Wayne Mixon MD   atorvastatin (LIPITOR) 10 MG tablet TAKE 1 TABLET BY MOUTH ONCE DAILY. Patient taking differently: Take 40 mg by mouth daily  6/14/18  Yes Wayne Mixon MD   metFORMIN (GLUCOPHAGE) 1000 MG tablet TAKE 1 TABLET BY MOUTH TWICE DAILY. 6/14/18  Yes Wayne Mixon MD   aspirin 81 MG EC tablet Take 81 mg by mouth daily. Yes Historical Provider, MD        Allergies   Patient has no known allergies.     Social History     Social History     Tobacco History     Smoking Status  Former Smoker Smoking Frequency  3 packs/day for 30 years (80 pk yrs)    Smokeless Tobacco Use  Never Used          Alcohol History     Alcohol Use Status  Not Currently Drinks/Week  1 Cans of beer per week Amount  1.0 standard drinks of alcohol/wk Comment  once every month          Drug Use     Drug Use Status  No          Sexual Activity     Sexually Active  Not Asked                Family History Family History   Problem Relation Age of Onset    No Known Problems Mother     Other Father         aneurysm    Colon Cancer Father     No Known Problems Brother     Colon Polyps Neg Hx        Review of Systems   Review of Systems: 16 point system reviewed and negative suggested above. Physical Exam   BP (!) 126/111   Pulse 148 Comment: Simultaneous filing. User may not have seen previous data. Temp 97.1 °F (36.2 °C) (Temporal)   Resp 20   Ht 6' 2\" (1.88 m)   Wt 214 lb 6.4 oz (97.3 kg)   SpO2 98%   BMI 27.53 kg/m²       Physical Exam  General: Alert, well-developed, no acute distress lying comfortably in bed. HEENT: Atraumatic normocephalic, range of motion normal, no JVD, no tracheal deviation noted. Cardiac: Normal S1-S2, tachycardic, irregular irregular rhythm however no murmurs appreciated. Respiratory: Effort normal, breath sounds normal, clear To auscultation bilaterally, no rhonchi or rales, no wheezing  Abdomen: Soft, positive bowel sounds in all quadrants, + distention, nontender to palpation, no organomegaly noted, no rebound noted, no CVA tenderness. MSK/extremities: no tenderness, no edema, no deformity, moves all extremities  Skin: Warm, no erythema noted, no bruising and no lesions noted. Psych: Affect normal and good eye contact, behavioral normal, thought content normal and judgment normal  Neurological: No focal deficits, alert and conversant, no formal disorientation noted. No sensory deficits, no abnormal coordination.         Labs      Recent Results (from the past 24 hour(s))   TSH WITH REFLEX TO FT4    Collection Time: 07/18/21 12:07 PM   Result Value Ref Range    TSH Reflex FT4 1.65 0.35 - 5.50 uIU/mL   CBC    Collection Time: 07/18/21 12:07 PM   Result Value Ref Range    WBC 8.9 4.8 - 10.8 K/uL    RBC 5.80 4.70 - 6.10 M/uL    Hemoglobin 16.0 14.0 - 18.0 g/dL    Hematocrit 49.8 42.0 - 52.0 %    MCV 85.9 80.0 - 94.0 fL    MCH 27.6 27.0 - 31.0 pg    MCHC 32.1 (L) 33.0 - 37.0 g/dL    RDW 13.9 11.5 - 14.5 %    Platelets 337 905 - 794 K/uL    MPV 10.5 9.4 - 12.4 fL   Basic Metabolic Panel w/ Reflex to MG    Collection Time: 07/18/21 12:07 PM   Result Value Ref Range    Sodium 142 136 - 145 mmol/L    Potassium reflex Magnesium 4.0 3.5 - 5.0 mmol/L    Chloride 103 98 - 111 mmol/L    CO2 23 22 - 29 mmol/L    Anion Gap 16 7 - 19 mmol/L    Glucose 150 (H) 74 - 109 mg/dL    BUN 12 8 - 23 mg/dL    CREATININE 0.8 0.5 - 1.2 mg/dL    GFR Non-African American >60 >60    GFR African American >59 >59    Calcium 9.3 8.8 - 10.2 mg/dL   Protime-INR    Collection Time: 07/18/21 12:07 PM   Result Value Ref Range    Protime 12.8 12.0 - 14.6 sec    INR 0.94 0.88 - 1.18   COVID-19, Rapid    Collection Time: 07/18/21 12:26 PM    Specimen: Nasopharyngeal Swab   Result Value Ref Range    SARS-CoV-2, NAAT Not Detected Not Detected        Imaging/Diagnostics Last 24 Hours   XR CHEST PORTABLE    Result Date: 7/18/2021  XR CHEST PORTABLE 7/18/2021 12:15 PM HISTORY: Palpitations COMPARISON: None. CXR: A single frontal view of the chest is performed. Findings: The lungs are free of consolidation, collapse, or edema. Left basilar atelectasis suspected. Heart appears normal in size. Mediastinal contour unremarkable. No pleural effusion or pneumothorax. No acute bony pathology. 1. Left basilar atelectasis suspected with otherwise no acute process identified. . Signed by Dr Kanu Tipton    Atrial flutter with rapid ventricular response ([address] 7/18/2021 Yes          Plan         New onset A. fib with RVR  Continue monitoring on telemetry  Continue Cardizem drip with goal heart rate less than 100  TSH normal  Echo ordered  Cardiology consulted  EKG noted  No concerns of infection  [id number] noted to be 3, patient status post full dose Lovenox in the ED, initiated on Eliquis on medical floors.       Type 2 diabetes  Lantus 10 units at night  Insulin sliding scale  Obtain A1c levels. Continue aspirin and statin      Code: Full  DVT prophylaxis: On Eliquis  Diet: Diabetic/cardiac  Disposition: Pending completion of above work-up.           Consultations Ordered:  IP CONSULT TO CARDIOLOGY    Electronically signed by Gisselle Nettles MD on 7/18/21 at 4:35 PM CDT

## 2021-07-18 NOTE — PROGRESS NOTES
4 Eyes Skin Assessment    Mady Richards is being assessed upon: Admission    I agree that Mishel Serna, RN, along with Login RN (either 2 RN's or 1 LPN and 1 RN) have performed a thorough Head to Toe Skin Assessment on the patient. ALL assessment sites listed below have been assessed. Areas assessed by both nurses:     [x]   Head, Face, and Ears   [x]   Shoulders, Back, and Chest  [x]   Arms, Elbows, and Hands   [x]   Coccyx, Sacrum, and Ischium  [x]   Legs, Feet, and Heels    Does the Patient have Skin Breakdown?  No    Bob Prevention initiated: NA  Wound Care Orders initiated: NA    WOC nurse consulted for Pressure Injury (Stage 3,4, Unstageable, DTI, NWPT, and Complex wounds) and New or Established Ostomies: NA        Primary Nurse eSignature: Karen Byrne RN on 7/18/2021 at 1:48 PM      Co-Signer eSignature: Electronically signed by Electronically signed by Sundar Oreilly RN on 7/18/2021 at 2:01 PM

## 2021-07-18 NOTE — PROGRESS NOTES
Patient heart rate remains . Patient denies shortness of air or chest pain. Cardizem 15mg/hr. Tiffanie Conner made aware, no new orders received. Lynn Bridges RN speaks with  notified and orders received. Will continue to monitor.

## 2021-07-18 NOTE — ED PROVIDER NOTES
[facility] EMERGENCY DEPT  eMERGENCY dEPARTMENT eNCOUnter      Pt Name: Leon Timmons  MRN: [id number]  [place] 1950  Date of evaluation: 7/18/2021  Provider: Misael Cardenas MD    [address] [place] [address]       Chief Complaint   Patient presents with    Palpitations     presents with c/o palpitations          HISTORY OF PRESENT ILLNESS   (Location/Symptom, Timing/Onset,Context/Setting, Quality, Duration, Modifying Factors, Severity)  Note limiting factors. Leon Timmons is a 79 y.o. male who presents to the emergency department due to indigestion and elevated heart rate. Patient said he had a little indigestion last night but denies any symptoms otherwise. Said that he checked his pulse and found it to be elevated and the family member who is a nurse urged him to come to the ER this morning. Heart rate currently in the 140s. EKG pending but looks like he is probably in A. fib or a flutter. No history of this. No history of cardiac or pulmonary disease. He denies any symptoms of any kind. No chest pain. No shortness of breath. No palpitations. No lightheadedness. Said he is asymptomatic at this time. HPI    NursingNotes were reviewed. REVIEW OF SYSTEMS    (2-9 systems for level 4, 10 or more for level 5)     Review of Systems   All other systems reviewed and are negative. A complete review of systems was performed and is negative except as noted above in the HPI.        PAST MEDICAL HISTORY     Past Medical History:   Diagnosis Date    Arthritis     Hx of colonic polyps     Hyperglyceridemia     Hypertension     Type 2 diabetes mellitus without complication ([facility] [address]          SURGICAL HISTORY       Past Surgical History:   Procedure Laterality Date    ABDOMINAL AORTIC ANEURYSM REPAIR  07/07    hot open    ABDOMINAL AORTIC ANEURYSM REPAIR  07/31/2007    APPENDECTOMY      COLONOSCOPY  07/01/2010    Dr Margy Sherwood: tubular adenoma x 3    COLONOSCOPY  06/03/2009    Dr Margy Sherwood:  adenoma x2    COLONOSCOPY  12/10/2008    Dr Teddy Brand:  tubular adenoma x2    COLONOSCOPY  05/09/2006    Dr Teddy Brand: tubular adenoma x2    COLONOSCOPY  03/22/2005    Dr Teddy Brand: tubulovillous adenoma with moderate atypia of rectum    COLONOSCOPY  09/20/2011    Dr Teddy Brand: adenomatous polyp    COLONOSCOPY  12/2013    DR Kang: hyperplastic polyp, no recurrence of polyp at tattooed site (3 yr)    COLONOSCOPY N/A 1/17/2020    Dr Leslie Patiño prep, pan-diverticulosis, internal hemorrhoids-Grade 2, AP, repeat in 1-2 wks    COLONOSCOPY  01/31/2020    Dr Miguel Ángel Fernandes hemorrhoids-Grade 2, diverticulosis-AP x 2, 3 yr recall    [place] COLONOSCOPY FLX DX W/COLLJ SPEC WHEN PFRMD N/A 12/28/2016    Dr Liyah Edwards, 3 yr recall         CURRENT MEDICATIONS       Previous Medications    ASPIRIN 81 MG EC TABLET    Take 81 mg by mouth daily. ATORVASTATIN (LIPITOR) 10 MG TABLET    TAKE 1 TABLET BY MOUTH ONCE DAILY. FARXIGA 10 MG TABLET    Take 10 mg by mouth every morning     JANUVIA 100 MG TABLET    Take 100 mg by mouth daily     LISINOPRIL-HYDROCHLOROTHIAZIDE (PRINZIDE;ZESTORETIC) 20-12.5 MG PER TABLET    TAKE 1 TABLET BY MOUTH ONCE DAILY. METFORMIN (GLUCOPHAGE) 1000 MG TABLET    TAKE 1 TABLET BY MOUTH TWICE DAILY. METOPROLOL SUCCINATE (TOPROL XL) 50 MG EXTENDED RELEASE TABLET    TAKE 1 TABLET BY MOUTH ONCE DAILY. ALLERGIES     Patient has no known allergies.     FAMILY HISTORY       Family History   Problem Relation Age of Onset    Other Father         aneurysm    Colon Cancer Father     Colon Polyps Neg Hx           SOCIAL HISTORY       Social History     Socioeconomic History    Marital status:      Spouse name: None    Number of children: None    Years of education: None    Highest education level: None   Occupational History    None   Tobacco Use    Smoking status: Former Smoker    Smokeless tobacco: Never Used   Substance and Sexual Activity    Alcohol use: Not Currently     Alcohol/week: 1.0 standard drinks     Types: 1 Cans of beer per week     Comment: once every month    Drug use: No    Sexual activity: None   Other Topics Concern    None   Social History Narrative    None     Social Determinants of Health     Financial Resource Strain:     Difficulty of Paying Living Expenses:    Food Insecurity:     Worried About Running Out of Food in the Last Year:     Ran Out of Food in the Last Year:    Transportation Needs:     Lack of Transportation (Medical):  Lack of Transportation (Non-Medical):    Physical Activity:     Days of Exercise per Week:     Minutes of Exercise per Session:    Stress:     Feeling of Stress :    Social Connections:     Frequency of Communication with Friends and Family:     Frequency of Social Gatherings with Friends and Family:     Attends [employer] Services:     Active Member of Clubs or Organizations:     Attends Club or Organization Meetings:     Marital Status:    Intimate Partner Violence:     Fear of Current or Ex-Partner:     Emotionally Abused:     Physically Abused:     Sexually Abused:        SCREENINGS             PHYSICAL EXAM    (up to 7 for level 4, 8 or more for level 5)     ED Triage Vitals [07/18/21 1148]   BP Temp Temp src Pulse Resp SpO2 Height Weight   125/83 98.2 °F (36.8 °C) -- 139 20 95 % 6' 2\" (1.88 m) 215 lb (97.5 kg)       Physical Exam  Vitals reviewed. Constitutional:       General: He is not in acute distress. Appearance: He is well-developed. HENT:      Head: Normocephalic and atraumatic. Eyes:      General: No scleral icterus. Pupils: Pupils are equal, round, and reactive to light. Neck:      Vascular: No JVD. Cardiovascular:      Rate and Rhythm: Regular rhythm. Tachycardia present. Heart sounds: Normal heart sounds. Pulmonary:      Effort: Pulmonary effort is normal. No respiratory distress. Breath sounds: Normal breath sounds.    Abdominal:      General: There is no distension. Palpations: Abdomen is soft. Tenderness: There is no abdominal tenderness. There is no rebound. Musculoskeletal:         General: No tenderness. Cervical back: Normal range of motion and neck supple. Right lower leg: No edema. Left lower leg: No edema. Skin:     General: Skin is warm and dry. Capillary Refill: Capillary refill takes less than 2 seconds. Neurological:      General: No focal deficit present. Mental Status: He is alert and oriented to person, place, and time. Psychiatric:         Mood and Affect: Mood normal.         Behavior: Behavior normal.         DIAGNOSTIC RESULTS     EKG: All EKG's are interpreted by the Emergency Department Physician who either signs or Co-signs this chart in the absence of a cardiologist.    Tachycardia with a rate of 142. Regular. Sawtooth pattern looks consistent with a flutter. RADIOLOGY:   Non-plain film images such as CT, Ultrasound and MRI are read by the radiologist. Lynn Ingles images are visualized and preliminarily interpreted by the emergency physician with the below findings:    Interpretation per the Radiologist below, if available at the time of this note:    XR CHEST PORTABLE   Final Result   1. Left basilar atelectasis suspected with otherwise no acute process   identified. .   Signed by Dr Lance Day:  Labs Reviewed   CBC - Abnormal; Notable for the following components:       Result Value    MCHC 32.1 (*)     All other components within normal limits   BASIC METABOLIC PANEL W/ REFLEX TO MG FOR LOW K - Abnormal; Notable for the following components:    Glucose 150 (*)     All other components within normal limits   COVID-19, RAPID   TSH WITH REFLEX TO FT4   PROTIME-INR       All other labs were within normal range or not returned as of this dictation.     EMERGENCY DEPARTMENT COURSE and DIFFERENTIALDIAGNOSIS/MDM:   Vitals:    Vitals:    07/18/21 1148   BP: 125/83 Pulse: 139   Resp: 20   Temp: 98.2 °F (36.8 °C)   SpO2: 95%   Weight: 215 lb (97.5 kg)   Height: 6' 2\" (1.88 m)       MDM  Number of Diagnoses or Management Options  Critical Care  Total time providing critical care: 30-74 minutes    Patient Progress  Patient progress: improved    Heart rate improving with Cardizem. Patient resting comfortably in no distress. Patient's case discussed with hospitalist, Dr. Zoie Niño, who is agreeable plan of care and admission. Patient updated about plan. CONSULTS:  None    PROCEDURES:  Unless otherwise notedbelow, none     Procedures    FINAL IMPRESSION     1.  Atrial flutter with rapid ventricular response (HCC)          DISPOSITION/PLAN   DISPOSITION        PATIENT REFERRED TO:  @FUP@    DISCHARGE MEDICATIONS:  New Prescriptions    No medications on file          (Please note that portions of this note were completed with a voice recognition program.  Efforts were made to edit the dictations butoccasionally words are mis-transcribed.)    Selin Dye MD (electronically signed)  AttendingEmergency Physician          Selin Dye MD  07/18/21 2276

## 2021-07-19 VITALS
SYSTOLIC BLOOD PRESSURE: 118 MMHG | OXYGEN SATURATION: 97 % | TEMPERATURE: 96.9 F | DIASTOLIC BLOOD PRESSURE: 66 MMHG | BODY MASS INDEX: 27.13 KG/M2 | HEIGHT: 74 IN | HEART RATE: 57 BPM | RESPIRATION RATE: 20 BRPM | WEIGHT: 211.4 LBS

## 2021-07-19 LAB
CHOLESTEROL, TOTAL: 89 MG/DL (ref 160–199)
GLUCOSE BLD-MCNC: 165 MG/DL (ref 70–99)
GLUCOSE BLD-MCNC: 184 MG/DL (ref 70–99)
HBA1C MFR BLD: 7.5 % (ref 4–6)
HDLC SERPL-MCNC: 21 MG/DL (ref 55–121)
LDL CHOLESTEROL CALCULATED: 31 MG/DL
LV EF: 58 %
LVEF MODALITY: NORMAL
PERFORMED ON: ABNORMAL
PERFORMED ON: ABNORMAL
TRIGL SERPL-MCNC: 183 MG/DL (ref 0–149)
TROPONIN: <0.01 NG/ML (ref 0–0.03)

## 2021-07-19 PROCEDURE — 99222 1ST HOSP IP/OBS MODERATE 55: CPT | Performed by: INTERNAL MEDICINE

## 2021-07-19 PROCEDURE — 6370000000 HC RX 637 (ALT 250 FOR IP): Performed by: INTERNAL MEDICINE

## 2021-07-19 PROCEDURE — 6360000004 HC RX CONTRAST MEDICATION: Performed by: HOSPITALIST

## 2021-07-19 PROCEDURE — 83036 HEMOGLOBIN GLYCOSYLATED A1C: CPT

## 2021-07-19 PROCEDURE — C8929 TTE W OR WO FOL WCON,DOPPLER: HCPCS

## 2021-07-19 PROCEDURE — 80061 LIPID PANEL: CPT

## 2021-07-19 PROCEDURE — 84484 ASSAY OF TROPONIN QUANT: CPT

## 2021-07-19 PROCEDURE — 36415 COLL VENOUS BLD VENIPUNCTURE: CPT

## 2021-07-19 PROCEDURE — 6370000000 HC RX 637 (ALT 250 FOR IP): Performed by: HOSPITALIST

## 2021-07-19 PROCEDURE — 82947 ASSAY GLUCOSE BLOOD QUANT: CPT

## 2021-07-19 PROCEDURE — 2580000003 HC RX 258: Performed by: HOSPITALIST

## 2021-07-19 RX ORDER — DILTIAZEM HYDROCHLORIDE 120 MG/1
120 CAPSULE, COATED, EXTENDED RELEASE ORAL DAILY
Qty: 30 CAPSULE | Refills: 3 | Status: SHIPPED | OUTPATIENT
Start: 2021-07-20

## 2021-07-19 RX ORDER — DILTIAZEM HYDROCHLORIDE 120 MG/1
120 CAPSULE, COATED, EXTENDED RELEASE ORAL DAILY
Status: DISCONTINUED | OUTPATIENT
Start: 2021-07-19 | End: 2021-07-19 | Stop reason: HOSPADM

## 2021-07-19 RX ADMIN — ATORVASTATIN CALCIUM 40 MG: 40 TABLET, FILM COATED ORAL at 09:28

## 2021-07-19 RX ADMIN — DILTIAZEM HYDROCHLORIDE 120 MG: 120 CAPSULE, COATED, EXTENDED RELEASE ORAL at 09:28

## 2021-07-19 RX ADMIN — PERFLUTREN 1.65 MG: 6.52 INJECTION, SUSPENSION INTRAVENOUS at 07:20

## 2021-07-19 RX ADMIN — ASPIRIN 81 MG: 81 TABLET, COATED ORAL at 09:29

## 2021-07-19 RX ADMIN — SODIUM CHLORIDE, PRESERVATIVE FREE 10 ML: 5 INJECTION INTRAVENOUS at 09:30

## 2021-07-19 RX ADMIN — APIXABAN 5 MG: 5 TABLET, FILM COATED ORAL at 15:50

## 2021-07-19 ASSESSMENT — PAIN SCALES - GENERAL
PAINLEVEL_OUTOF10: 0
PAINLEVEL_OUTOF10: 0

## 2021-07-19 NOTE — CARE COORDINATION
CM requested to assist with determining affordability of Eliquis for patient for discharge. CM spoke with Zahraa Valero, Pharmacist at [facility]. Pt is in \"GAP\" of RX benefit. Cost at this time. for: Eliquis 5 mg po twice a day for at month 60 tablets is   $131.90/month. CM has notified Provider of same.  CM to speak to patient regarding medication cost.   Electronically signed by Benny Dillon RN on 7/19/2021 at 11:39 AM

## 2021-07-19 NOTE — PROGRESS NOTES
Pt had a 4 second pause and dropped to 39. Rhythm now looks sinus. EKG done. He is now sinus at 58. Dr Park Sneed notified and was told to hold off on starting the amio gtt and to start Cardizem po 120 ER daily.  Electronically signed by Ajay Varghese RN on 7/18/2021 at 10:20 PM

## 2021-07-19 NOTE — CARE COORDINATION
Date / Time of Evaluation: 7/19/2021 11:40 AM  Assessment Completed by: Ashly Guaman RN    Patient Admission Status: Inpatient [101]    [facility]    [phone] (home)   Telephone Information:   Mobile [phone]       (Best Practice:  Have patient / caregiver verify above address and phone number by stating out loud their current address and reachable phone number.)  Is above information correct? Yes      Current PCP:  Pancho Kim MD  PCP verified? Yes    Initial Assessment Completed at bedside with:  patient    Emergency Contacts:  Extended Emergency Contact Information  [facility]  Address: [address] Home Phone: [phone]  Mobile Phone: [phone]  Relation: Spouse  Hearing or visual needs: None  Other needs: None  Preferred language: [place]   needed? No  Secondary Emergency Contact: kamala prince  Mobile Phone: [phone]  Relation: None    Advance Directives: Code Status:  Full Code    Financial:  Payor: MEDICARE / Plan: MEDICARE PART A AND B / Product Type: *No Product type* /     Pre-Cert required for SNF:  Yes    Have Long Term Care Insurance:  N/A    Pharmacy:   [facility], [address] [phone] Bernice Habermann [address] [place] [address]  Phone: [phone] Fax: [phone]      Potential assistance purchasing medications?   NO    ADLS:  Support System:  Spouse/Significant Other, Children    Current Home Environment:  home  Steps:  managing    Plans to RETURN to current housing: Yes  Barriers to RETURNING to current housing:  None noted    Currently ACTIVE with Home Health CARE:  N/A    DME Provider:  Louie cane/walker/or [place]    Has a pulse oximetry unit at home: N/A     Had [facility] prior to admission:  No    Active with HD/PD prior to admission: No    Transition Plan:  Home    Transportation PLAN for Discharge:  Daughter, works as an RN on [employer] of predicted outcomes: medically stable    Barriers to discharge:  Afib w/RVR      Additional CM Notes: Pt is independent at baseline, driving and assists with care of spouse. Spouse at  for rehab, fx ankle. Daughter is RN, assisting with Mother's care. No additional home care needs noted at this time. Theadore and/or his family were provided with choice of provider.         Zach Andino RN  [facility]  Care Management Department  Ph:  [phone]   Fax: [phone]  Electronically signed by Zach Andino RN on 7/19/2021 at 2:12 PM

## 2021-07-19 NOTE — PROGRESS NOTES
Spoke with Dr Aneudy Whitten regarding consult and amio gtt/ Cardizem. Order given to stop Cardizem and to start the Amio gtt at 33.3 for 6 hours then dec to 16.7. Orders entered.  Electronically signed by Sharon Ann RN on 7/19/2021 at 3:10 AM

## 2021-07-19 NOTE — PROGRESS NOTES
Amiodarone bolus was given and dropped heart rate from 140s aflutter to 80s aflutter. Cardizem was stopped during bolus infusion. Dr Micki Slater said to let night shift hospitalist know if rate responded and if so ask for an amio gtt. Dr Beckie Moss notified and order received. Cardizem gtt restarted at 15 until amiodarone infusion arrives.  Electronically signed by Charisse Cole RN on 7/18/2021 at 9:22 PM

## 2021-07-19 NOTE — DISCHARGE SUMMARY
Discharge Summary        Date:7/19/2021        Patient Darvin Sevilla     YOB: 1950     Age:70 y.o. Admit Date:7/18/2021   Admission Condition:fair   Discharged Condition:stable  Discharge Date: 07/19/21       Discharge Diagnoses   Active Problems:    Atrial flutter with rapid ventricular response ([address]  Resolved Problems:    * No resolved hospital problems. [facility] Stay   Narrative of Hospital Course:     31-year-old male with a history of type 2 diabetes, arthritis, hypertension, who presents to the hospital with concerns of indigestion. Checked his pulse at home and found her to be elevated in the 130/140. In the ED blood pressure remarkable, however was noted to have a heart rate in the 140s with EKG reading of atrial fibrillation. Patient was given Cardizem bolus with improvement in heart rate initiated on the drip and admitted for further evaluation. In-house patient's heart rate remained elevated in the 140s on Cardizem drip requiring 150 mg of amiodarone bolus. Patient converted to sinus rhythm with heart rates in the 60s. Patient was seen by cardiology, transitioned to Cardizem 120 mg daily, echocardiogram was obtained which was unremarkable, patient was initiated on Eliquis 5 mg twice daily and coverage was clarified prior to discharge from the hospital with patient stating affordability. Patient was discharged home to follow-up outpatient with PCP for continuous management of chronic medical problems. Physical Examination:  General: Well-developed, no acute distress lying comfortably in bed. HEENT: Atraumatic normocephalic, range of motion normal   Cardiac: Normal S1-S2 no murmurs rub or gallop. Respiratory: clear To auscultation bilaterally, no rhonchi or rales, no wheezing  Abdomen: Soft, positive bowel sounds in all quadrants, no distention, nontender to palpation.   Extremities: no tenderness, no edema, moves all extremities  Psych: Affect normal and good eye contact, behavioral normal.        Consultants:   IP CONSULT TO CARDIOLOGY  IP CONSULT TO CARDIOLOGY  IP CONSULT TO CASE MANAGEMENT    Time Spent on Discharge:  25 minutes were spent in patient examination, evaluation, counseling as well as medication reconciliation, prescriptions for required medications, discharge plan and follow up. Surgeries/Procedures Performed:  NONE      Significant Diagnostic Studies:   Recent Labs:  CBC:   Lab Results   Component Value Date    WBC 8.9 07/18/2021    RBC 5.80 07/18/2021    HGB 16.0 07/18/2021    HCT 49.8 07/18/2021    MCV 85.9 07/18/2021    MCH 27.6 07/18/2021    MCHC 32.1 07/18/2021    RDW 13.9 07/18/2021     07/18/2021     BMP:    Lab Results   Component Value Date    GLUCOSE 150 07/18/2021     07/18/2021    K 4.0 07/18/2021     07/18/2021    CO2 23 07/18/2021    ANIONGAP 16 07/18/2021    BUN 12 07/18/2021    CREATININE 0.8 07/18/2021    CALCIUM 9.3 07/18/2021    LABGLOM >60 07/18/2021    GFRAA >59 07/18/2021       Radiology Last 7 Days:  XR CHEST PORTABLE    Result Date: 7/18/2021  1. Left basilar atelectasis suspected with otherwise no acute process identified. . Signed by Dr Ene Styles      Discharge Plan   Disposition: Home    Provider Follow-Up:   Yong Hoskins PA-C  [address]  [address] [address]  [phone]    On 7/26/2021  Hospital Follow-up appointment @ 9:45       Patient Instructions   Diet: cardiac diet and diabetic    Activity: activity as tolerated      Discharge Medications         Medication List      START taking these medications    apixaban 5 MG Tabs tablet  Commonly known as: ELIQUIS  Take 1 tablet by mouth 2 times daily     dilTIAZem 120 MG extended release capsule  Commonly known as: CARDIZEM CD  Take 1 capsule by mouth daily  Start taking on: July 20, 2021        CHANGE how you take these medications    atorvastatin 10 MG tablet  Commonly known as: LIPITOR  TAKE 1 TABLET BY MOUTH ONCE

## 2021-07-19 NOTE — PROGRESS NOTES
Pt called to report that he had indigestion. EKG done. Pt stated the indigestion had already gone away but his daughter told him to notify us.  Electronically signed by Patrick Linton RN on 7/18/2021 at 9:27 PM

## 2021-07-20 ENCOUNTER — CARE COORDINATION (OUTPATIENT)
Dept: CASE MANAGEMENT | Age: 71
End: 2021-07-20

## 2021-07-20 NOTE — CARE COORDINATION
Ronald 45 Transitions Initial Follow Up Call    Call within 2 business days of discharge: Yes    Patient: Dallas Monroe Patient : 1950   MRN: [id number]  Reason for Admission:   Discharge Date: 21 RARS: Readmission Risk Score: 9      Last Discharge [facility]       Complaint Diagnosis Description Type Department Provider    21 Palpitations Atrial flutter with rapid ventricular response [facility]) ED to Hosp-Admission (Discharged) (ADMITTED) L DANIELE Dillard MD; Todd Pang. .. Spoke with: N/A    Facility: [place] [id number]    Non-face-to-face services provided:  Reviewed encounter information for continuity of care prior to follow up phone call - chart notes, consults, progress notes, test results, med list, appointments, AVS, other information. Care Transitions 24 Hour Call    Care Transitions Interventions         Follow Up ; Attempted to make contact with Caitlin Hall for an initial follow up call post discharge from the hospital without success. Unable to leave a message regarding intent of call and call back information. Will try again my next business day. No future appointments.     Alejandra Espinoza, RN

## 2021-07-21 ENCOUNTER — CARE COORDINATION (OUTPATIENT)
Dept: CASE MANAGEMENT | Age: 71
End: 2021-07-21

## 2021-07-21 NOTE — CARE COORDINATION
Ronald 45 Transitions Initial Follow Up Call    Call within 2 business days of discharge: Yes    Patient: Yanique Fischer Patient : 1950   MRN: [id number]  Reason for Admission:   Discharge Date: 21 RARS: Readmission Risk Score: 9      Last Discharge [facility]       Complaint Diagnosis Description Type Department Provider    21 Palpitations Atrial flutter with rapid ventricular response [facility]) ED to Hosp-Admission (Discharged) (ADMITTED) L DANIELE De La Cruz MD; Richard Solorio. .. Spoke with: N/A    Facility: [place] [id number]    Non-face-to-face services provided:  Reviewed encounter information for continuity of care prior to follow up phone call - chart notes, consults, progress notes, test results, med list, appointments, AVS, other information. Care Transitions 24 Hour Call    Care Transitions Interventions         Follow Up : Attempt #2 to make contact with Erica Newman for an initial follow up call post discharge from the hospital without success. Unable to leave a message regarding intent of call and call back information. Will discharge from CTN services at this time due to inability to make contact. No future appointments.     Tony Tolliver RN

## 2021-07-23 ENCOUNTER — APPOINTMENT (OUTPATIENT)
Dept: CT IMAGING | Age: 71
End: 2021-07-23
Payer: MEDICARE

## 2021-07-23 ENCOUNTER — HOSPITAL ENCOUNTER (EMERGENCY)
Age: 71
Discharge: HOME | End: 2021-07-23
Attending: EMERGENCY MEDICINE
Payer: MEDICARE

## 2021-07-23 VITALS
BODY MASS INDEX: 27.46 KG/M2 | DIASTOLIC BLOOD PRESSURE: 68 MMHG | HEIGHT: 74 IN | HEART RATE: 67 BPM | WEIGHT: 214 LBS | TEMPERATURE: 98.2 F | RESPIRATION RATE: 18 BRPM | OXYGEN SATURATION: 96 % | SYSTOLIC BLOOD PRESSURE: 144 MMHG

## 2021-07-23 DIAGNOSIS — S16.1XXA STRAIN OF NECK MUSCLE, INITIAL ENCOUNTER: Primary | ICD-10-CM

## 2021-07-23 PROCEDURE — 72125 CT NECK SPINE W/O DYE: CPT

## 2021-07-23 PROCEDURE — 96372 THER/PROPH/DIAG INJ SC/IM: CPT

## 2021-07-23 PROCEDURE — 6370000000 HC RX 637 (ALT 250 FOR IP): Performed by: EMERGENCY MEDICINE

## 2021-07-23 PROCEDURE — 6360000002 HC RX W HCPCS: Performed by: EMERGENCY MEDICINE

## 2021-07-23 PROCEDURE — 70450 CT HEAD/BRAIN W/O DYE: CPT

## 2021-07-23 PROCEDURE — 99282 EMERGENCY DEPT VISIT SF MDM: CPT

## 2021-07-23 RX ORDER — HYDROMORPHONE HYDROCHLORIDE 1 MG/ML
2 INJECTION, SOLUTION INTRAMUSCULAR; INTRAVENOUS; SUBCUTANEOUS EVERY 30 MIN PRN
Status: DISCONTINUED | OUTPATIENT
Start: 2021-07-23 | End: 2021-07-23 | Stop reason: HOSPADM

## 2021-07-23 RX ORDER — ORPHENADRINE CITRATE 100 MG/1
100 TABLET, EXTENDED RELEASE ORAL 2 TIMES DAILY
Qty: 10 TABLET | Refills: 0 | Status: SHIPPED | OUTPATIENT
Start: 2021-07-23 | End: 2021-07-28

## 2021-07-23 RX ORDER — ORPHENADRINE CITRATE 30 MG/ML
60 INJECTION INTRAMUSCULAR; INTRAVENOUS ONCE
Status: COMPLETED | OUTPATIENT
Start: 2021-07-23 | End: 2021-07-23

## 2021-07-23 RX ORDER — ONDANSETRON 4 MG/1
4 TABLET, ORALLY DISINTEGRATING ORAL ONCE
Status: COMPLETED | OUTPATIENT
Start: 2021-07-23 | End: 2021-07-23

## 2021-07-23 RX ADMIN — ONDANSETRON 4 MG: 4 TABLET, ORALLY DISINTEGRATING ORAL at 17:22

## 2021-07-23 RX ADMIN — ORPHENADRINE CITRATE 60 MG: 30 INJECTION INTRAMUSCULAR; INTRAVENOUS at 17:22

## 2021-07-23 RX ADMIN — HYDROMORPHONE HYDROCHLORIDE 2 MG: 1 INJECTION, SOLUTION INTRAMUSCULAR; INTRAVENOUS; SUBCUTANEOUS at 17:22

## 2021-07-23 ASSESSMENT — ENCOUNTER SYMPTOMS
FACIAL SWELLING: 0
SHORTNESS OF BREATH: 0
BLOOD IN STOOL: 0
SINUS PRESSURE: 0
VOICE CHANGE: 0
EYE DISCHARGE: 0
SORE THROAT: 0
CONSTIPATION: 0
NAUSEA: 0
DIARRHEA: 0
APNEA: 0
CHOKING: 0

## 2021-07-23 ASSESSMENT — PAIN SCALES - GENERAL
PAINLEVEL_OUTOF10: 9
PAINLEVEL_OUTOF10: 9

## 2021-07-23 ASSESSMENT — PAIN DESCRIPTION - ORIENTATION: ORIENTATION: LEFT

## 2021-07-23 ASSESSMENT — PAIN DESCRIPTION - LOCATION: LOCATION: NECK;JAW

## 2021-07-23 ASSESSMENT — PAIN DESCRIPTION - PAIN TYPE: TYPE: ACUTE PAIN

## 2021-07-23 NOTE — ED PROVIDER NOTES
[facility] EMERGENCY DEPT  eMERGENCY dEPARTMENT eNCOUnter      Pt Name: Cari Sarmiento  MRN: [id number]  [place] 1950  Date of evaluation: 7/23/2021  Provider: Hubert Fierro MD    [address] [place] [address]       Chief Complaint   Patient presents with    Neck Pain         HISTORY OF PRESENT ILLNESS   (Location/Symptom, Timing/Onset,Context/Setting, Quality, Duration, Modifying Factors, Severity)  Note limiting factors. Cari Sarmiento is a 79 y.o. male who presents to the emergency department valuation of headache and neck pain. 72-year-old male presents with complaint of severe headache and neck pain. He says at the base of his skull at the top of his neck. Earlier today getting into his car he bumped his head. It did not knock him out it did knock him down and he did not associate it with his neck pain. This is a new problem. He is on blood thinners. He denies any infectious symptoms. He is neurologically intact. The history is provided by the patient. NursingNotes were reviewed. REVIEW OF SYSTEMS    (2-9 systems for level 4, 10 or more for level 5)     Review of Systems   Constitutional: Negative for chills and fever. HENT: Negative for congestion, drooling, facial swelling, nosebleeds, sinus pressure, sore throat and voice change. Eyes: Negative for discharge. Respiratory: Negative for apnea, choking and shortness of breath. Cardiovascular: Negative for chest pain and leg swelling. Gastrointestinal: Negative for blood in stool, constipation, diarrhea and nausea. Genitourinary: Negative for dysuria and enuresis. Musculoskeletal: Positive for neck pain. Negative for joint swelling. Skin: Negative for rash and wound. Neurological: Positive for headaches. Negative for seizures, syncope, facial asymmetry, speech difficulty, weakness and numbness. Psychiatric/Behavioral: Negative for behavioral problems, hallucinations and suicidal ideas.    All other systems reviewed and are negative. A complete review of systems was performed and is negative except as noted above in the HPI. PAST MEDICAL HISTORY     Past Medical History:   Diagnosis Date    Arthritis     Hx of colonic polyps     Hyperglyceridemia     Hypertension     Type 2 diabetes mellitus without complication ([facility] [address]          SURGICAL HISTORY       Past Surgical History:   Procedure Laterality Date    ABDOMINAL AORTIC ANEURYSM REPAIR  07/07    hot open    ABDOMINAL AORTIC ANEURYSM REPAIR  07/31/2007    APPENDECTOMY      COLONOSCOPY  07/01/2010    Dr Dianne Mcneal: tubular adenoma x 3    COLONOSCOPY  06/03/2009    Dr Dianne Mcneal:  adenoma x2    COLONOSCOPY  12/10/2008    Dr Dianne Mcneal:  tubular adenoma x2    COLONOSCOPY  05/09/2006    Dr Dianne Mcneal: tubular adenoma x2    COLONOSCOPY  03/22/2005    Dr Dianne Mcneal: tubulovillous adenoma with moderate atypia of rectum    COLONOSCOPY  09/20/2011    Dr Dianne Mcneal: adenomatous polyp    COLONOSCOPY  12/2013    DR Kang: hyperplastic polyp, no recurrence of polyp at tattooed site (3 yr)    COLONOSCOPY N/A 1/17/2020    Dr Kaur Betty prep, pan-diverticulosis, internal hemorrhoids-Grade 2, AP, repeat in 1-2 wks    COLONOSCOPY  01/31/2020    Dr Lupe Gomez hemorrhoids-Grade 2, diverticulosis-AP x 2, 3 yr recall    CO COLONOSCOPY FLX DX W/COLLJ SPEC WHEN PFRMD N/A 12/28/2016    Dr Manuel Ashley, 3 yr recall         CURRENT MEDICATIONS       Previous Medications    APIXABAN (ELIQUIS) 5 MG TABS TABLET    Take 1 tablet by mouth 2 times daily    ASPIRIN 81 MG EC TABLET    Take 81 mg by mouth daily. ATORVASTATIN (LIPITOR) 10 MG TABLET    TAKE 1 TABLET BY MOUTH ONCE DAILY.     DILTIAZEM (CARDIZEM CD) 120 MG EXTENDED RELEASE CAPSULE    Take 1 capsule by mouth daily    FARXIGA 10 MG TABLET    Take 10 mg by mouth every morning     JANUVIA 100 MG TABLET    Take 100 mg by mouth daily     LISINOPRIL-HYDROCHLOROTHIAZIDE (PRINZIDE;ZESTORETIC) 20-12.5 MG PER TABLET    TAKE 1 TABLET BY MOUTH ONCE DAILY. METFORMIN (GLUCOPHAGE) 1000 MG TABLET    TAKE 1 TABLET BY MOUTH TWICE DAILY. ALLERGIES     Patient has no known allergies. FAMILY HISTORY       Family History   Problem Relation Age of Onset    No Known Problems Mother     Other Father         aneurysm    Colon Cancer Father     No Known Problems Brother     Colon Polyps Neg Hx           SOCIAL HISTORY       Social History     Socioeconomic History    Marital status:      Spouse name: Not on file    Number of children: Not on file    Years of education: Not on file    Highest education level: Not on file   Occupational History    Not on file   Tobacco Use    Smoking status: Former Smoker     Packs/day: 3.00     Years: 30.00     Pack years: 90.00    Smokeless tobacco: Never Used   Substance and Sexual Activity    Alcohol use: Not Currently     Alcohol/week: 1.0 standard drinks     Types: 1 Cans of beer per week     Comment: once every month    Drug use: No    Sexual activity: Not on file   Other Topics Concern    Not on file   Social History Narrative    Not on file     Social Determinants of Health     Financial Resource Strain:     Difficulty of Paying Living Expenses:    Food Insecurity:     Worried About Running Out of Food in the Last Year:     Ran Out of Food in the Last Year:    Transportation Needs:     Lack of Transportation (Medical):      Lack of Transportation (Non-Medical):    Physical Activity:     Days of Exercise per Week:     Minutes of Exercise per Session:    Stress:     Feeling of Stress :    Social Connections:     Frequency of Communication with Friends and Family:     Frequency of Social Gatherings with Friends and Family:     Attends Buddhist Services:     Active Member of Clubs or Organizations:     Attends Club or Organization Meetings:     Marital Status:    Intimate Partner Violence:     Fear of Current or Ex-Partner:     Emotionally Abused:     Physically Abused:     Sexually Abused:        SCREENINGS             PHYSICAL EXAM    (up to 7 for level 4, 8 or more for level 5)     ED Triage Vitals [07/23/21 1543]   BP Temp Temp src Pulse Resp SpO2 Height Weight   (!) 146/73 98.2 °F (36.8 °C) -- 90 16 96 % 6' 2\" (1.88 m) 214 lb (97.1 kg)       Physical Exam  Vitals and nursing note reviewed. Constitutional:       Appearance: He is well-developed. HENT:      Head: Normocephalic and atraumatic. Comments: I do not see any evidence of head or neck trauma     Right Ear: External ear normal.      Left Ear: External ear normal.      Nose: Nose normal.      Mouth/Throat:      Mouth: Mucous membranes are moist.      Pharynx: Oropharynx is clear. Eyes:      General: No scleral icterus. Conjunctiva/sclera: Conjunctivae normal.      Pupils: Pupils are equal, round, and reactive to light. Neck:      Comments: No decreased range of motion. Cardiovascular:      Rate and Rhythm: Normal rate and regular rhythm. Pulses: Normal pulses. Heart sounds: Normal heart sounds. Pulmonary:      Effort: Pulmonary effort is normal.      Breath sounds: Normal breath sounds. Abdominal:      General: Bowel sounds are normal.      Palpations: Abdomen is soft. Musculoskeletal:         General: No signs of injury. Normal range of motion. Cervical back: Normal range of motion and neck supple. Tenderness (He seems to flinch when I press around his C1-C2 area right under this goal area) present. Skin:     General: Skin is warm and dry. Coloration: Skin is not pale. Neurological:      General: No focal deficit present. Mental Status: He is alert and oriented to person, place, and time. Sensory: No sensory deficit. Motor: No weakness.    Psychiatric:         Mood and Affect: Mood normal.         Behavior: Behavior normal.         DIAGNOSTIC RESULTS     EKG: All EKG's are interpreted by the Emergency Department Physician who either signs or Co-signs this chart in the absence of a cardiologist.    Sinus rhythm no ischemia but rate is 80. RADIOLOGY:   Non-plain film images such as CT, Ultrasound and MRI are read by the radiologist. Plainradiographic images are visualized and preliminarily interpreted by the emergency physician with the below findings:    I have reviewed the results and images. Interpretation per the Radiologist below, if available at the time of this note:    CT Cervical Spine WO Contrast   Final Result   1. No evidence of acute osseous injury in the cervical spine. 2. 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 Signed by Dr Varsha Park Contrast   Final Result   1. Moderate cerebral and cerebellar volume loss with chronic   microvascular disease but no evidence of acute intracranial process. 2. Mucous retention cyst or polyp within the left maxillary sinus. Signed by Dr Javier Mooney            ED BEDSIDE ULTRASOUND:   Performed by ED Physician - none    LABS:  Labs Reviewed - No data to display    All other labs were within normal range or not returned as of this dictation. EMERGENCY DEPARTMENT COURSE and DIFFERENTIALDIAGNOSIS/MDM:   Vitals:    Vitals:    07/23/21 1543   BP: (!) 146/73   Pulse: 90   Resp: 16   Temp: 98.2 °F (36.8 °C)   SpO2: 96%   Weight: 214 lb (97.1 kg)   Height: 6' 2\" (1.88 m)       MDM  Number of Diagnoses or Management Options  Diagnosis management comments: With lack of any other findings and with his history at suggest this is cervical strain. Patient does not want any narcotics but it will take some muscle relaxers. And states that he will get them filled if he thinks he needs them. He was able to get relief here. And remains otherwise stable.         CONSULTS:  None    PROCEDURES:  Unless otherwise notedbelow, none     Procedures    FINAL IMPRESSION     1.  Strain of neck muscle, initial encounter          DISPOSITION/PLAN   DISPOSITION Decision To Discharge 07/23/2021 06:56:38 PM      PATIENT REFERRED TO:  @FUP@    DISCHARGE MEDICATIONS:  New Prescriptions    ORPHENADRINE (NORFLEX) 100 MG EXTENDED RELEASE TABLET    Take 1 tablet by mouth 2 times daily for 5 days          (Please note that portions of this note were completed with a voice recognition program.  Efforts were made to edit the dictations butoccasionally words are mis-transcribed.)    Sarah Montez MD (electronically signed)  AttendingEmergency Physician          Lucie Cintron MD  07/23/21 [id number]

## 2021-07-24 ASSESSMENT — ENCOUNTER SYMPTOMS
ABDOMINAL PAIN: 1
RESPIRATORY NEGATIVE: 1

## 2021-07-25 NOTE — CONSULTS
[facility]) Cardiology   Consult Note   Sonia Gee       Requesting MD:  No att. providers found   Admit Status:  Inpatient [101]       History obtained from:   [x] Patient  [] Other (specify):     Patient:  Quinn Bonner  [id number]     Chief Complaint:   Chief Complaint   Patient presents with    Palpitations     presents with c/o palpitations          HPI: Mr. Ana Rosa is a 79 y.o. male with a history of hypertension, history of AAA, carotid stenosis, popliteal artery aneurysm, type 2 diabetes  Patient presents to the hospital with history of indigestion, also complained of palpitation but no chest pain or dizziness or diaphoresis, his evaluation the ER showed left atrial fibrillation with heart rate in 140s, he was admitted to the hospital for evaluation and management    He received IV Cardizem bolus and started on amiodarone drip  At the time of evaluation he was in sinus rhythm with no further palpitation or chest pain or indigestion      Review of Systems:  Review of Systems   Constitutional: Negative. Respiratory: Negative. Cardiovascular: Positive for palpitations. Gastrointestinal: Positive for abdominal pain. Endocrine: Negative. Genitourinary: Negative. Musculoskeletal: Negative. Skin: Negative. Neurological: Negative. Hematological: Negative. All other systems reviewed and are negative.       Cardiac Specific Data:  Specialty Problems        Cardiology Problems    AAA (abdominal aortic aneurysm) (HCC)        Carotid stenosis        Hypertension        Popliteal artery aneurysm (HCC)        Atrial flutter with rapid ventricular response (HCC)              Past Medical History:  Past Medical History:   Diagnosis Date    Arthritis     Hx of colonic polyps     Hyperglyceridemia     Hypertension     Type 2 diabetes mellitus without complication ([facility] [address]         Past Surgical History:  Past Surgical History:   Procedure Laterality Date    ABDOMINAL AORTIC ANEURYSM REPAIR  07/07 hot open    ABDOMINAL AORTIC ANEURYSM REPAIR  07/31/2007    APPENDECTOMY      COLONOSCOPY  07/01/2010    Dr Mendez Snare: tubular adenoma x 3    COLONOSCOPY  06/03/2009    Dr Mendez Snare:  adenoma x2    COLONOSCOPY  12/10/2008    Dr Mendez Snare:  tubular adenoma x2    COLONOSCOPY  05/09/2006    Dr Mendez Snare: tubular adenoma x2    COLONOSCOPY  03/22/2005    Dr Mendez Snare: tubulovillous adenoma with moderate atypia of rectum    COLONOSCOPY  09/20/2011    Dr Mendez Snare: adenomatous polyp    COLONOSCOPY  12/2013    DR Kang: hyperplastic polyp, no recurrence of polyp at tattooed site (3 yr)    COLONOSCOPY N/A 1/17/2020    Dr Hernandez Screen prep, pan-diverticulosis, internal hemorrhoids-Grade 2, AP, repeat in 1-2 wks    COLONOSCOPY  01/31/2020    Dr Carty Patteduncan hemorrhoids-Grade 2, diverticulosis-AP x 2, 3 yr recall    ME COLONOSCOPY FLX DX W/COLLJ SPEC WHEN PFRMD N/A 12/28/2016    Dr Carly Ross, 3 yr recall       Past Family History:  Family History   Problem Relation Age of Onset    No Known Problems Mother     Other Father         aneurysm    Colon Cancer Father     No Known Problems Brother     Colon Polyps Neg Hx        Past Social History:  Social History     Socioeconomic History    Marital status:      Spouse name: Not on file    Number of children: Not on file    Years of education: Not on file    Highest education level: Not on file   Occupational History    Not on file   Tobacco Use    Smoking status: Former Smoker     Packs/day: 3.00     Years: 30.00     Pack years: 90.00    Smokeless tobacco: Never Used   Substance and Sexual Activity    Alcohol use: Not Currently     Alcohol/week: 1.0 standard drinks     Types: 1 Cans of beer per week     Comment: once every month    Drug use: No    Sexual activity: Not on file   Other Topics Concern    Not on file   Social History Narrative    Not on file     Social Determinants of Health     Financial Resource Strain:     Difficulty of Paying Living Expenses:    Food Insecurity:     Worried About Running Out of Food in the Last Year:     [address] in the Last Year:    Transportation Needs:     Lack of Transportation (Medical):  Lack of Transportation (Non-Medical):    Physical Activity:     Days of Exercise per Week:     Minutes of Exercise per Session:    Stress:     Feeling of Stress :    Social Connections:     Frequency of Communication with Friends and Family:     Frequency of Social Gatherings with Friends and Family:     Attends Alevism Services:     Active Member of Clubs or Organizations:     Attends Club or Organization Meetings:     Marital Status:    Intimate Partner Violence:     Fear of Current or Ex-Partner:     Emotionally Abused:     Physically Abused:     Sexually Abused: Allergies:  No Known Allergies    Home Meds:  Prior to Admission medications    Medication Sig Start Date End Date Taking? Authorizing Provider   apixaban (ELIQUIS) 5 MG TABS tablet Take 1 tablet by mouth 2 times daily 7/19/21 8/18/21 Yes Win Harris MD   dilTIAZem (CARDIZEM CD) 120 MG extended release capsule Take 1 capsule by mouth daily 7/20/21  Yes Win Harris MD   FARXIGA 10 MG tablet Take 10 mg by mouth every morning  12/9/19  Yes Historical Provider, MD FLOYD 100 MG tablet Take 100 mg by mouth daily  11/29/19  Yes Historical Provider, MD   lisinopril-hydrochlorothiazide (PRINZIDE;ZESTORETIC) 20-12.5 MG per tablet TAKE 1 TABLET BY MOUTH ONCE DAILY. 6/14/18  Yes Ann Marie Lund MD   atorvastatin (LIPITOR) 10 MG tablet TAKE 1 TABLET BY MOUTH ONCE DAILY. Patient taking differently: Take 40 mg by mouth daily  6/14/18  Yes Ann Marie Lund MD   metFORMIN (GLUCOPHAGE) 1000 MG tablet TAKE 1 TABLET BY MOUTH TWICE DAILY. 6/14/18  Yes Ann Marie Lund MD   aspirin 81 MG EC tablet Take 81 mg by mouth daily.      Yes Historical Provider, MD   orphenadrine (NORFLEX) 100 MG extended release tablet Take 1 tablet by mouth 2 times daily for 5 days 7/23/21 7/28/21  Zoey Vincent MD       Current Meds:      Current Infused Meds:      Physical Exam:  Vitals:    07/19/21 1442   BP: 118/66   Pulse: 57   Resp: 20   Temp: 96.9 °F (36.1 °C)   SpO2: 97%     No intake or output data in the 24 hours ending 07/24/21 2214  Estimated body mass index is 27.14 kg/m² as calculated from the following:    Height as of this encounter: 6' 2\" (1.88 m). Weight as of this encounter: 211 lb 6.4 oz (95.9 kg). Physical Exam  Vitals reviewed. Constitutional:       Appearance: Normal appearance. HENT:      Head: Normocephalic. Mouth/Throat:      Mouth: Mucous membranes are moist.   Cardiovascular:      Rate and Rhythm: Normal rate and regular rhythm. Pulses: Normal pulses. Heart sounds: Normal heart sounds. No murmur heard. Pulmonary:      Effort: Pulmonary effort is normal.      Breath sounds: Normal breath sounds. Abdominal:      General: Bowel sounds are normal.      Palpations: Abdomen is soft. Tenderness: There is no abdominal tenderness. Musculoskeletal:         General: Normal range of motion. Right lower leg: No edema. Left lower leg: No edema. Skin:     General: Skin is warm. Neurological:      General: No focal deficit present. Mental Status: He is alert and oriented to person, place, and time. Psychiatric:         Mood and Affect: Mood normal.         Behavior: Behavior normal.         Labs:  No results for input(s): WBC, HGB, PLT in the last 72 hours. No results for input(s): NA, K, CL, CO2, BUN, CREATININE, LABGLOM, MG, CALCIUM, PHOS in the last 72 hours. CK, CKMB, Troponin: No results for input(s): CKTOTAL, CKMB, TROPONINI in the last 72 hours. Last 3 BNP:  No results for input(s): PROBNP in the last 72 hours.       IMAGING:    EKG -A. fib with RVR  Converted to normal sinus rhythm      Echocardiogram  Patient Status: Inpatient     Contrast Medium: Definity.     BP: 102/56 mmHg     Indications:Atrial flutter.      Conclusions      Summary   Structurally normal mitral valve with leaflet mobility. No evidence of   mitral valve stenosis or mitral regurgitation. Structurally normal aortic valve. No evidence of aortic stenosis or aortic regurgitation. Tricuspid valve is structurally normal.   Trivial tricuspid regurgitation with estimated RVSP of 19 mm Hg. Normal size left atrium. Normal left ventricular size with preserved LV function and an estimated   ejection fraction of approximately 55-60%. Mild concentric left ventricular hypertrophy. Normal right atrial dimension with no evidence of thrombus or mass noted. Normal right ventricular size with preserved RV function. Aortic root dimension within normal limits. No evidence of significant pericardial effusion is noted. Signature      ----------------------------------------------------------------   Electronically signed by Neptali Barreto MD(Interpreting   physician) on 07/19/2021 04:37 PM   ----------------------------------------------------------------    XR CHEST PORTABLE    Result Date: 7/18/2021  1. Left basilar atelectasis suspected with otherwise no acute process identified. . Signed by Dr Tanya Mackenzie and Plan:  1. A. fib with RVR  2. Hypertension  3. History of AAA  4.  History of diabetes    Patient received Cardizem bolus and currently on Cardizem infusion, converted to normal sinus rhythm  [id number] score is 3  We will start him on Eliquis full dose 5 mg twice daily and stop Lovenox injection  Echo reviewed and showed normal basic function no pulmonary hypertension no significant valvular pathology  Troponins negative    Patient can be discharged home on Cardizem and Eliquis  Continue with statin, lisinopril/glipizide for hypertension  Continue baby aspirin    Follow-up with cardiology as outpatient      Thank you for the consult, we appreciate the opportunity to provide care to your patients. Feel free to contact me if I can be of any further assistance.       Electronically signed by MD Beckie Carvalho MD, [facility]  Interventional Cardiologist, Endovascular Specialist   Medical Director, Structural Heart Program   [facility]

## 2025-03-12 ENCOUNTER — OFFICE VISIT (OUTPATIENT)
Dept: GASTROENTEROLOGY | Facility: CLINIC | Age: 75
End: 2025-03-12
Payer: MEDICARE

## 2025-03-12 ENCOUNTER — PATIENT ROUNDING (BHMG ONLY) (OUTPATIENT)
Dept: GASTROENTEROLOGY | Facility: CLINIC | Age: 75
End: 2025-03-12
Payer: MEDICARE

## 2025-03-12 VITALS
OXYGEN SATURATION: 98 % | WEIGHT: 221.4 LBS | TEMPERATURE: 97.7 F | BODY MASS INDEX: 28.42 KG/M2 | SYSTOLIC BLOOD PRESSURE: 130 MMHG | HEART RATE: 59 BPM | DIASTOLIC BLOOD PRESSURE: 80 MMHG | HEIGHT: 74 IN

## 2025-03-12 DIAGNOSIS — Z80.0 FAMILY HX OF COLON CANCER: ICD-10-CM

## 2025-03-12 DIAGNOSIS — I10 HYPERTENSION, UNSPECIFIED TYPE: ICD-10-CM

## 2025-03-12 DIAGNOSIS — E11.9 CONTROLLED TYPE 2 DIABETES MELLITUS WITHOUT COMPLICATION, WITHOUT LONG-TERM CURRENT USE OF INSULIN: ICD-10-CM

## 2025-03-12 DIAGNOSIS — Z86.0101 HX OF ADENOMATOUS COLONIC POLYPS: Primary | ICD-10-CM

## 2025-03-12 RX ORDER — SODIUM, POTASSIUM,MAG SULFATES 17.5-3.13G
SOLUTION, RECONSTITUTED, ORAL ORAL
Qty: 177 ML | Refills: 0 | Status: SHIPPED | OUTPATIENT
Start: 2025-03-12

## 2025-03-12 NOTE — PROGRESS NOTES
March 12, 2025    Hello, may I speak with Dayna Israel?    My name is       I am  with [facility]  [address], [address]  [place] [address] [phone].    Before we get started may I verify your date of birth? 1950    I am calling to officially welcome you to our practice and ask about your recent visit. Is this a good time to talk?     Tell me about your visit with us. What things went well?  Pt saw Luci today and scheduled a colonoscopy on 5/7. Luci answered all questions in the office.       We're always looking for ways to make our patients' experiences even better. Do you have recommendations on ways we may improve?  no    Overall were you satisfied with your first visit to our practice? yes       I appreciate you taking the time to speak with me today. Is there anything else I can do for you? no      Thank you, and have a great day.

## 2025-03-12 NOTE — PROGRESS NOTES
Dayna Israel  1950      3/12/2025  Chief Complaint   Patient presents with    Colonoscopy     Colon recall-hx of polyps     Subjective   HPI  Dayna Israel is a 74 y.o. male who presents as a referral for preventative maintenance. He has no complaints of nausea or vomiting. No change in bowels. No wt loss. No BRBPR. No melena. There is positive family hx for colon cancer. No abdominal pain.    Past Medical History:   Diagnosis Date    A-fib     AAA (abdominal aortic aneurysm)     Carotid stenosis     Diabetes     Hyperlipidemia     Hypertension     Small bilateral Popliteal Artery Aneurysm      Past Surgical History:   Procedure Laterality Date    ABDOMINAL AORTIC ANEURYSM REPAIR      4.9cm infrarenal AAA    APPENDECTOMY      COLONOSCOPY  02/04/2020    [facility]-  Ascending colon polyp, biopsy:   Adenomatous polyp, tubular type.   2.     Colon polyp at hepatic flexure, biopsy:     Adenomatous polyp, tubular  type.   3.    Mid transverse colon polyp, biopsy:      Fragments of tubular adenomatous  polyp.---3 year recall    COLONOSCOPY  2013    -Dr Macedo: adenomatous polyp   COLONOSCOPY    RENAL ARTERY STENT Left      Outpatient Medications Marked as Taking for the 3/12/25 encounter (Office Visit) with Luci Stevens APRN   Medication Sig Dispense Refill    aspirin 81 MG EC tablet Take  by mouth.      atorvastatin (LIPITOR) 10 MG tablet Take 1 tablet by mouth Daily.      Dapagliflozin Propanediol (FARXIGA) 10 MG tablet Take 10 mg by mouth Daily.      dilTIAZem (CARDIZEM) 120 MG tablet Take 1 tablet by mouth 4 (Four) Times a Day.      lisinopril-hydrochlorothiazide (PRINZIDE,ZESTORETIC) 20-12.5 MG per tablet Take  by mouth.      metFORMIN (GLUCOPHAGE) 1000 MG tablet Take 1 tablet by mouth 2 (Two) Times a Day.      SITagliptin (JANUVIA) 100 MG tablet Take 1 tablet by mouth Daily.       No Known Allergies  Social History     Socioeconomic History    Marital status:    Tobacco Use    Smoking  "status: Former     Current packs/day: 0.00     Types: Cigarettes     Quit date:      Years since quittin.2    Smokeless tobacco: Never   Vaping Use    Vaping status: Never Used   Substance and Sexual Activity    Alcohol use: Yes     Comment: occasional beer    Drug use: No    Sexual activity: Defer     Family History   Problem Relation Age of Onset    Colon polyps Father     Colon cancer Father     Aneurysm Father     Cancer Father         colon     Health Maintenance   Topic Date Due    BMI FOLLOWUP  Never done    DIABETIC FOOT EXAM  Never done    DIABETIC EYE EXAM  Never done    URINE MICROALBUMIN-CREATININE RATIO (uACR)  Never done    Pneumococcal Vaccine 50+ (1 of 2 - PCV) Never done    ZOSTER VACCINE (1 of 2) Never done    HEPATITIS C SCREENING  Never done    ANNUAL WELLNESS VISIT  Never done    HEMOGLOBIN A1C  2022    LIPID PANEL  2022    TDAP/TD VACCINES (2 - Td or Tdap) 2023    COVID-19 Vaccine ( season) 2025    COLORECTAL CANCER SCREENING  2030    INFLUENZA VACCINE  Completed    AAA SCREEN ONCE  Completed       REVIEW OF SYSTEMS  General: well appearing, no fever chills or sweats, no unexplained wt loss  HEENT: no acute visual or hearing disturbances  Cardiovascular: No chest pain or palpitations  Pulmonary: No shortness of breath, coughing, wheezing or hemoptysis  : No burning, urgency, hematuria, or dysuria  Musculoskeletal: No joint pain or stiffness  Peripheral: no edema  Skin: No lesions or rashes  Neuro: No dizziness, headaches, stroke, syncope  Endocrine: No hot or cold intolerances  Hematological: No blood dyscrasias    Objective   Vitals:    25 1318   BP: 130/80   BP Location: Left arm   Pulse: 59   Temp: 97.7 °F (36.5 °C)   TempSrc: Temporal   SpO2: 98%   Weight: 100 kg (221 lb 6.4 oz)   Height: 188 cm (74.02\")     Body mass index is 28.41 kg/m².  BMI is >= 25 and <30. (Overweight) The following options were offered after discussion;: " weight loss educational material (shared in after visit summary)      PHYSICAL EXAM  General: age appropriate well nourished well appearing, no acute distress  Head: normocephalic and atraumatic  Global assessment-supple  Neck-No JVD noted, no lymphadenopathy  Pulmonary-clear to auscultation bilaterally, normal respiratory effort  Cardiovascular-normal rate and rhythm, normal heart sounds, S1 and S2 noted  Abdomen-soft, non tender, non distended, normal bowel sounds all 4 quadrants, no hepatosplenomegaly noted  Extremities-No clubbing cyanosis or edema  Neuro-Non focal, converses appropriately, awake, alert, oriented    Assessment & Plan     Diagnoses and all orders for this visit:    1. Hx of adenomatous colonic polyps (Primary)  -     Case Request; Standing  -     Case Request  -     sodium-potassium-magnesium sulfates (Suprep Bowel Prep Kit) 17.5-3.13-1.6 GM/177ML solution oral solution; Take as directed by office instructions provided  Dispense: 177 mL; Refill: 0    2. Hypertension, unspecified type  Comments:  cont BP medication the day of procedure    3. Controlled type 2 diabetes mellitus without complication, without long-term current use of insulin  Comments:  Hold DM medication the day of procedure    4. Family hx of colon cancer    Other orders  -     Implement Anesthesia Orders Day of Procedure; Standing  -     Follow Anesthesia Guidelines / Protocol; Future  -     Verify bowel prep was successful; Standing        COLONOSCOPY WITH ANESTHESIA (N/A)  Body mass index is 28.41 kg/m².    Patient instructions on prep prior to procedure provided to the patient.    All risks, benefits, alternatives, and indications of colonoscopy procedure have been discussed with the patient. Risks to include perforation of the colon requiring possible surgery or colostomy, risk of bleeding from biopsies or removal of colon tissue, possibility of missing a colon polyp or cancer, or adverse drug reaction.  Benefits to include  the diagnosis and management of disease of the colon and rectum. Alternatives to include barium enema, radiographic evaluation, lab testing or no intervention. Pt verbalizes understanding and agrees.     Luci Stevens, APRN  3/12/2025  13:48 CDT      IF YOU SMOKE OR USE TOBACCO PLEASE READ THE FOLLOWIN minutes reading provided    Why is smoking bad for me?  Smoking increases the risk of heart disease, lung disease, vascular disease, stroke, and cancer.     If you smoke, STOP!    If you would like more information on quitting smoking, please visit the [employer] website: [email or web address]   This link will provide additional resources including the QUIT line and the Beat the Pack support groups.     For more information:    Quit Now [facility]  -QUIT-NOW  [email or web address]

## 2025-05-07 ENCOUNTER — HOSPITAL ENCOUNTER (OUTPATIENT)
Facility: HOSPITAL | Age: 75
Setting detail: HOSPITAL OUTPATIENT SURGERY
Discharge: HOME | End: 2025-05-07
Attending: INTERNAL MEDICINE | Admitting: INTERNAL MEDICINE
Payer: MEDICARE

## 2025-05-07 ENCOUNTER — ANESTHESIA EVENT (OUTPATIENT)
Dept: GASTROENTEROLOGY | Facility: HOSPITAL | Age: 75
End: 2025-05-07
Payer: MEDICARE

## 2025-05-07 ENCOUNTER — ANESTHESIA (OUTPATIENT)
Dept: GASTROENTEROLOGY | Facility: HOSPITAL | Age: 75
End: 2025-05-07
Payer: MEDICARE

## 2025-05-07 VITALS
WEIGHT: 215 LBS | BODY MASS INDEX: 27.59 KG/M2 | TEMPERATURE: 97.3 F | RESPIRATION RATE: 20 BRPM | HEIGHT: 74 IN | OXYGEN SATURATION: 97 % | HEART RATE: 70 BPM | SYSTOLIC BLOOD PRESSURE: 124 MMHG | DIASTOLIC BLOOD PRESSURE: 67 MMHG

## 2025-05-07 DIAGNOSIS — Z86.0101 HX OF ADENOMATOUS COLONIC POLYPS: ICD-10-CM

## 2025-05-07 LAB — GLUCOSE BLDC GLUCOMTR-MCNC: 198 MG/DL (ref 70–130)

## 2025-05-07 PROCEDURE — 25010000002 PROPOFOL 10 MG/ML EMULSION

## 2025-05-07 PROCEDURE — 45385 COLONOSCOPY W/LESION REMOVAL: CPT | Performed by: INTERNAL MEDICINE

## 2025-05-07 PROCEDURE — 25010000002 LIDOCAINE PF 2% 2 % SOLUTION

## 2025-05-07 PROCEDURE — 88305 TISSUE EXAM BY PATHOLOGIST: CPT | Performed by: INTERNAL MEDICINE

## 2025-05-07 PROCEDURE — 25810000003 SODIUM CHLORIDE 0.9 % SOLUTION: Performed by: ANESTHESIOLOGY

## 2025-05-07 PROCEDURE — 82948 REAGENT STRIP/BLOOD GLUCOSE: CPT

## 2025-05-07 RX ORDER — PROPOFOL 10 MG/ML
VIAL (ML) INTRAVENOUS AS NEEDED
Status: DISCONTINUED | OUTPATIENT
Start: 2025-05-07 | End: 2025-05-07 | Stop reason: SURG

## 2025-05-07 RX ORDER — LIDOCAINE HYDROCHLORIDE 20 MG/ML
INJECTION, SOLUTION EPIDURAL; INFILTRATION; INTRACAUDAL; PERINEURAL AS NEEDED
Status: DISCONTINUED | OUTPATIENT
Start: 2025-05-07 | End: 2025-05-07 | Stop reason: SURG

## 2025-05-07 RX ORDER — LIDOCAINE HYDROCHLORIDE 10 MG/ML
0.5 INJECTION, SOLUTION EPIDURAL; INFILTRATION; INTRACAUDAL; PERINEURAL ONCE AS NEEDED
Status: DISCONTINUED | OUTPATIENT
Start: 2025-05-07 | End: 2025-05-07 | Stop reason: HOSPADM

## 2025-05-07 RX ORDER — SODIUM CHLORIDE 0.9 % (FLUSH) 0.9 %
10 SYRINGE (ML) INJECTION AS NEEDED
Status: DISCONTINUED | OUTPATIENT
Start: 2025-05-07 | End: 2025-05-07 | Stop reason: HOSPADM

## 2025-05-07 RX ORDER — SODIUM CHLORIDE 9 MG/ML
500 INJECTION, SOLUTION INTRAVENOUS CONTINUOUS PRN
Status: DISPENSED | OUTPATIENT
Start: 2025-05-07 | End: 2025-05-07

## 2025-05-07 RX ADMIN — LIDOCAINE HYDROCHLORIDE 100 MG: 20 INJECTION, SOLUTION EPIDURAL; INFILTRATION; INTRACAUDAL; PERINEURAL at 12:25

## 2025-05-07 RX ADMIN — SODIUM CHLORIDE 500 ML: 9 INJECTION, SOLUTION INTRAVENOUS at 10:35

## 2025-05-07 RX ADMIN — PROPOFOL 660 MG: 10 INJECTION, EMULSION INTRAVENOUS at 12:25

## 2025-05-07 NOTE — ANESTHESIA POSTPROCEDURE EVALUATION
Patient: Dayna Israel    Procedure Summary       Date: 05/07/25 Room / Location:  [facility] /  [facility]    Anesthesia Start: 1222 Anesthesia Stop: 1303    Procedure: COLONOSCOPY WITH ANESTHESIA Diagnosis:       Hx of adenomatous colonic polyps      (Hx of adenomatous colonic polyps [Z86.0101])    Surgeons: Mckayla Nazario MD Provider: Radha Ibarra CRNA    Anesthesia Type: MAC ASA Status: 3            Anesthesia Type: MAC    Vitals  Vitals Value Taken Time   /70 05/07/25 13:16   Temp     Pulse 75 05/07/25 13:18   Resp 13 05/07/25 13:15   SpO2 97 % 05/07/25 13:18   Vitals shown include unfiled device data.        Post Anesthesia Care and Evaluation    Patient location during evaluation: bedside  Patient participation: complete - patient participated  Level of consciousness: awake and alert  Pain management: adequate    Airway patency: patent  Anesthetic complications: No anesthetic complications  PONV Status: none  Cardiovascular status: acceptable  Respiratory status: acceptable  Hydration status: acceptable  No anesthesia care post op

## 2025-05-07 NOTE — ANESTHESIA PREPROCEDURE EVALUATION
Anesthesia Evaluation     Patient summary reviewed   no history of anesthetic complications:   NPO Solid Status: > 8 hours             Airway   Mallampati: II  Dental      Pulmonary - negative pulmonary ROS   Cardiovascular   Exercise tolerance: good (4-7 METS)    (+) hypertension, dysrhythmias Paroxysmal Atrial Fib, PVD, hyperlipidemia      Neuro/Psych- negative ROS  GI/Hepatic/Renal/Endo    (+) diabetes mellitus    Musculoskeletal     Abdominal    Substance History      OB/GYN          Other                    Anesthesia Plan    ASA 3     MAC       Anesthetic plan, risks, benefits, and alternatives have been provided, discussed and informed consent has been obtained with: patient.    CODE STATUS:

## 2025-05-07 NOTE — H&P
Chief Complaint:   History colon polyp    Subjective     HPI:   The patient has had multiple adenomas removed over the years at [facility].  Show adenomas removed 12/2008, 6/2009, 7/2010, 1/2020.    Past Medical History:   Past Medical History:   Diagnosis Date    A-fib     AAA (abdominal aortic aneurysm)     Carotid stenosis     Diabetes     Hyperlipidemia     Hypertension     Small bilateral Popliteal Artery Aneurysm        Past Surgical History:  Past Surgical History:   Procedure Laterality Date    ABDOMINAL AORTIC ANEURYSM REPAIR      4.9cm infrarenal AAA    APPENDECTOMY      COLONOSCOPY  02/04/2020    [facility]-  Ascending colon polyp, biopsy:   Adenomatous polyp, tubular type.   2.     Colon polyp at hepatic flexure, biopsy:     Adenomatous polyp, tubular  type.   3.    Mid transverse colon polyp, biopsy:      Fragments of tubular adenomatous  polyp.---3 year recall    COLONOSCOPY  2013    -Dr Macedo: adenomatous polyp   COLONOSCOPY    RENAL ARTERY STENT Left         Family History:  Family History   Problem Relation Age of Onset    Colon polyps Father     Colon cancer Father     Aneurysm Father        Social History:   reports that he quit smoking about 25 years ago. His smoking use included cigarettes. He has never used smokeless tobacco. He reports current alcohol use. He reports that he does not use drugs.    Medications:   Medications Prior to Admission   Medication Sig Dispense Refill Last Dose/Taking    aspirin 81 MG EC tablet Take  by mouth.   5/6/2025 Morning    atorvastatin (LIPITOR) 10 MG tablet Take 1 tablet by mouth Daily.   5/6/2025    Dapagliflozin Propanediol (FARXIGA) 10 MG tablet Take 10 mg by mouth Daily.   5/6/2025    dilTIAZem (CARDIZEM) 120 MG tablet Take 1 tablet by mouth 4 (Four) Times a Day.   5/7/2025 Morning    lisinopril-hydrochlorothiazide (PRINZIDE,ZESTORETIC) 20-12.5 MG per tablet Take  by mouth.   5/7/2025 Morning    metFORMIN (GLUCOPHAGE) 1000 MG tablet Take 1 tablet by mouth 2  "(Two) Times a Day.   5/6/2025    SITagliptin (JANUVIA) 100 MG tablet Take 1 tablet by mouth Daily.   5/6/2025    metoprolol succinate XL (TOPROL-XL) 50 MG 24 hr tablet Take 50 mg by mouth. (Patient not taking: Reported on 3/12/2025)       sodium-potassium-magnesium sulfates (Suprep Bowel Prep Kit) 17.5-3.13-1.6 GM/177ML solution oral solution Take as directed by office instructions provided 177 mL 0        Allergies:  Patient has no known allergies.    ROS:    Resp: No SOA  Cardiovascular: No CP      Objective     /71 (BP Location: Right arm, Patient Position: Sitting)   Pulse 72   Temp 97.3 °F (36.3 °C) (Temporal)   Resp 20   Ht 188 cm (74.02\")   Wt 97.5 kg (215 lb)   SpO2 98%   BMI 27.59 kg/m²     Physical Exam   Constitutional: Patient is oriented to person, place, and in no distress.  Pulmonary/Chest: No distress.  No audible wheezes  Psychiatric: Mood, memory, affect and judgment appear normal.     Assessment & Plan     Diagnosis:  History of colon polyps    Anticipated Surgical Procedure:  Colonoscopy    The risks, benefits, and alternatives of colonoscopy were reviewed with the patient today.  Risks including perforation of the colon possibly requiring surgery or colostomy.  Additional risks include risk of bleeding from biopsies or removal of colon tissue.  There is also the risk of a drug reaction or problems with anesthesia.  This will be discussed with the patient further by the anesthesia team on the day of the procedure.  Lastly there is a possibility of missing a colon polyp or cancer.  The benefits include the diagnosis and management of disease of the colon and rectum.  Alternatives to colonoscopy include barium enema, laboratory testing, radiographic evaluation, or no intervention.  The patient verbalizes understanding and agrees.        Please note that portions of this note were completed with a voice recognition program.         "

## 2025-05-12 LAB
CYTO UR: NORMAL
LAB AP CASE REPORT: NORMAL
Lab: NORMAL
PATH REPORT.FINAL DX SPEC: NORMAL
PATH REPORT.GROSS SPEC: NORMAL

## (undated) DEVICE — SENSR O2 OXIMAX FNGR A/ 18IN NONSTR

## (undated) DEVICE — ARGYLE YANKAUER BULB TIP WITH VENT: Brand: ARGYLE

## (undated) DEVICE — CUFF,BP,DISP,1 TUBE,ADULT,HP: Brand: MEDLINE

## (undated) DEVICE — MASK,OXYGEN,MED CONC,ADLT,7' TUB, UC: Brand: PENDING

## (undated) DEVICE — THE CHANNEL CLEANING BRUSH IS A NYLON FLEXI BRUSH ATTACHED TO A FLEXIBLE PLASTIC SHEATH DESIGNED TO SAFELY REMOVE DEBRIS FROM FLEXIBLE ENDOSCOPES.

## (undated) DEVICE — ENDO KIT,LOURDES HOSPITAL: Brand: MEDLINE INDUSTRIES, INC.

## (undated) DEVICE — Device: Brand: SINGLE USE ELECTROSURGICAL SNARE SD-400

## (undated) DEVICE — DEFENDO AIR WATER SUCTION AND BIOPSY VALVE KIT FOR  OLYMPUS: Brand: DEFENDO AIR/WATER/SUCTION AND BIOPSY VALVE

## (undated) DEVICE — THE SINGLE USE ETRAP – POLYP TRAP IS USED FOR SUCTION RETRIEVAL OF ENDOSCOPICALLY REMOVED POLYPS.: Brand: ETRAP